# Patient Record
Sex: MALE | Race: WHITE | Employment: OTHER | URBAN - METROPOLITAN AREA
[De-identification: names, ages, dates, MRNs, and addresses within clinical notes are randomized per-mention and may not be internally consistent; named-entity substitution may affect disease eponyms.]

---

## 2017-02-28 ENCOUNTER — HOSPITAL ENCOUNTER (OUTPATIENT)
Dept: PHYSICAL THERAPY | Age: 77
Discharge: HOME | End: 2017-02-28
Payer: MEDICARE

## 2017-02-28 ENCOUNTER — HOSPITAL ENCOUNTER (OUTPATIENT)
Dept: PREADMISSION TESTING | Age: 77
Discharge: HOME | End: 2017-02-28
Payer: MEDICARE

## 2017-02-28 ENCOUNTER — HOSPITAL ENCOUNTER (OUTPATIENT)
Dept: GENERAL RADIOLOGY | Age: 77
Discharge: HOME | End: 2017-02-28
Attending: ORTHOPAEDIC SURGERY
Payer: MEDICARE

## 2017-02-28 VITALS
OXYGEN SATURATION: 97 % | RESPIRATION RATE: 20 BRPM | WEIGHT: 187.5 LBS | HEIGHT: 71 IN | HEART RATE: 65 BPM | DIASTOLIC BLOOD PRESSURE: 63 MMHG | TEMPERATURE: 97.5 F | SYSTOLIC BLOOD PRESSURE: 119 MMHG | BODY MASS INDEX: 26.25 KG/M2

## 2017-02-28 LAB
ABO + RH BLD: NORMAL
ALBUMIN SERPL BCP-MCNC: 3.6 G/DL (ref 3.5–5)
ALBUMIN/GLOB SERPL: 1 {RATIO} (ref 1.1–2.2)
ALP SERPL-CCNC: 80 U/L (ref 45–117)
ALT SERPL-CCNC: 17 U/L (ref 12–78)
ANION GAP BLD CALC-SCNC: 9 MMOL/L (ref 5–15)
APPEARANCE UR: CLEAR
AST SERPL W P-5'-P-CCNC: 14 U/L (ref 15–37)
ATRIAL RATE: 59 BPM
BACTERIA URNS QL MICRO: NEGATIVE /HPF
BILIRUB SERPL-MCNC: 0.4 MG/DL (ref 0.2–1)
BILIRUB UR QL: NEGATIVE
BLOOD GROUP ANTIBODIES SERPL: NORMAL
BUN SERPL-MCNC: 17 MG/DL (ref 6–20)
BUN/CREAT SERPL: 22 (ref 12–20)
CALCIUM SERPL-MCNC: 8.3 MG/DL (ref 8.5–10.1)
CALCULATED P AXIS, ECG09: 30 DEGREES
CALCULATED R AXIS, ECG10: 21 DEGREES
CALCULATED T AXIS, ECG11: 19 DEGREES
CHLORIDE SERPL-SCNC: 104 MMOL/L (ref 97–108)
CO2 SERPL-SCNC: 28 MMOL/L (ref 21–32)
COLOR UR: NORMAL
CREAT SERPL-MCNC: 0.79 MG/DL (ref 0.7–1.3)
DIAGNOSIS, 93000: NORMAL
EPITH CASTS URNS QL MICRO: NORMAL /LPF
ERYTHROCYTE [DISTWIDTH] IN BLOOD BY AUTOMATED COUNT: 13.4 % (ref 11.5–14.5)
EST. AVERAGE GLUCOSE BLD GHB EST-MCNC: 140 MG/DL
GLOBULIN SER CALC-MCNC: 3.5 G/DL (ref 2–4)
GLUCOSE SERPL-MCNC: 128 MG/DL (ref 65–100)
GLUCOSE UR STRIP.AUTO-MCNC: NEGATIVE MG/DL
HBA1C MFR BLD: 6.5 % (ref 4.2–6.3)
HCT VFR BLD AUTO: 37.7 % (ref 36.6–50.3)
HGB BLD-MCNC: 12.3 G/DL (ref 12.1–17)
HGB UR QL STRIP: NEGATIVE
HYALINE CASTS URNS QL MICRO: NORMAL /LPF (ref 0–5)
INR PPP: 1.1 (ref 0.9–1.1)
KETONES UR QL STRIP.AUTO: NEGATIVE MG/DL
LEUKOCYTE ESTERASE UR QL STRIP.AUTO: NEGATIVE
MCH RBC QN AUTO: 30.9 PG (ref 26–34)
MCHC RBC AUTO-ENTMCNC: 32.6 G/DL (ref 30–36.5)
MCV RBC AUTO: 94.7 FL (ref 80–99)
NITRITE UR QL STRIP.AUTO: NEGATIVE
P-R INTERVAL, ECG05: 166 MS
PH UR STRIP: 6 [PH] (ref 5–8)
PLATELET # BLD AUTO: 220 K/UL (ref 150–400)
POTASSIUM SERPL-SCNC: 4.3 MMOL/L (ref 3.5–5.1)
PROT SERPL-MCNC: 7.1 G/DL (ref 6.4–8.2)
PROT UR STRIP-MCNC: NEGATIVE MG/DL
PROTHROMBIN TIME: 10.6 SEC (ref 9–11.1)
Q-T INTERVAL, ECG07: 430 MS
QRS DURATION, ECG06: 134 MS
QTC CALCULATION (BEZET), ECG08: 425 MS
RBC # BLD AUTO: 3.98 M/UL (ref 4.1–5.7)
RBC #/AREA URNS HPF: NORMAL /HPF (ref 0–5)
SODIUM SERPL-SCNC: 141 MMOL/L (ref 136–145)
SP GR UR REFRACTOMETRY: 1.02 (ref 1–1.03)
SPECIMEN EXP DATE BLD: NORMAL
UA: UC IF INDICATED,UAUC: NORMAL
UROBILINOGEN UR QL STRIP.AUTO: 0.2 EU/DL (ref 0.2–1)
VENTRICULAR RATE, ECG03: 59 BPM
WBC # BLD AUTO: 6.7 K/UL (ref 4.1–11.1)
WBC URNS QL MICRO: NORMAL /HPF (ref 0–4)

## 2017-02-28 PROCEDURE — 85610 PROTHROMBIN TIME: CPT | Performed by: ORTHOPAEDIC SURGERY

## 2017-02-28 PROCEDURE — 97161 PT EVAL LOW COMPLEX 20 MIN: CPT

## 2017-02-28 PROCEDURE — 93005 ELECTROCARDIOGRAM TRACING: CPT

## 2017-02-28 PROCEDURE — 36415 COLL VENOUS BLD VENIPUNCTURE: CPT | Performed by: ORTHOPAEDIC SURGERY

## 2017-02-28 PROCEDURE — 80053 COMPREHEN METABOLIC PANEL: CPT | Performed by: ORTHOPAEDIC SURGERY

## 2017-02-28 PROCEDURE — G8978 MOBILITY CURRENT STATUS: HCPCS

## 2017-02-28 PROCEDURE — 97110 THERAPEUTIC EXERCISES: CPT

## 2017-02-28 PROCEDURE — G8980 MOBILITY D/C STATUS: HCPCS

## 2017-02-28 PROCEDURE — 83036 HEMOGLOBIN GLYCOSYLATED A1C: CPT | Performed by: ORTHOPAEDIC SURGERY

## 2017-02-28 PROCEDURE — G8979 MOBILITY GOAL STATUS: HCPCS

## 2017-02-28 PROCEDURE — 81001 URINALYSIS AUTO W/SCOPE: CPT | Performed by: ORTHOPAEDIC SURGERY

## 2017-02-28 PROCEDURE — 86900 BLOOD TYPING SEROLOGIC ABO: CPT | Performed by: ORTHOPAEDIC SURGERY

## 2017-02-28 PROCEDURE — 85027 COMPLETE CBC AUTOMATED: CPT | Performed by: ORTHOPAEDIC SURGERY

## 2017-02-28 RX ORDER — TERAZOSIN 10 MG/1
10 CAPSULE ORAL
COMMUNITY

## 2017-02-28 RX ORDER — VALSARTAN AND HYDROCHLOROTHIAZIDE 320; 12.5 MG/1; MG/1
1 TABLET, FILM COATED ORAL DAILY
COMMUNITY

## 2017-02-28 RX ORDER — SODIUM CHLORIDE, SODIUM LACTATE, POTASSIUM CHLORIDE, CALCIUM CHLORIDE 600; 310; 30; 20 MG/100ML; MG/100ML; MG/100ML; MG/100ML
25 INJECTION, SOLUTION INTRAVENOUS CONTINUOUS
Status: CANCELLED | OUTPATIENT
Start: 2017-03-13

## 2017-02-28 RX ORDER — IBUPROFEN 200 MG
400 TABLET ORAL
COMMUNITY
End: 2017-03-15

## 2017-02-28 RX ORDER — PAROXETINE HYDROCHLORIDE 20 MG/1
20 TABLET, FILM COATED ORAL DAILY
COMMUNITY

## 2017-02-28 NOTE — PERIOP NOTES
Tranexamic Acid:  Used to minimize blood loss, reduce incidence of        symptomatic blood loss anemia and reduce need for blood transfusions                 Two Doses Indicated:         - TXA 1 gram IVPB every 3 hrs for 2 doses. - For TKR the first dose is 30 minutes before tourniquet release and the           second dose  is approximately 3 hours later in PACU. Order placed for 2 doses.  Jeanne Molina RN

## 2017-02-28 NOTE — PERIOP NOTES
Faxed preop labs and ekg to Dr Han Mena for review and request clearance. Faxed preop labs to Dr Steven Gaspar office noting chemistry. Fax confirmed.

## 2017-02-28 NOTE — H&P
[facility], [address]   STAT PREOP HISTORY AND PHYSICAL       Name:  Td Evans   MR#:  [id number]   :  1940   Account #:  [de-identified]        Date of Adm:  2017       CHIEF COMPLAINT: Left knee pain. HISTORY: This is a 55-year-old gentleman with end-stage   osteoarthritis of the left knee with a large loose bodies in the knee, who   is being admitted for left total knee arthroplasty. He has taken anti-  inflammatory medicines. He has a significant amount of instability in   the knee intermittently when walking. ALLERGIES: AMOXICILLIN. MEDICATIONS:   1. Atorvastatin 10 mg/day. 2. Ibuprofen 200 mg tabs. 3. Terazosin 10 mg/day. 4. Lumigan ophthalmic solution for left eye. 5. Metformin 1000 mg p.o. b.i.d.   6. Dorzolamide ophthalmic solution left eye b.i.d.   7. Valsartan/hydrochlorothiazide 320/12.5 one per day. 8. Paroxetine 20 mg/day. ILLNESSES: Positive for hypertension, diabetes mellitus. SURGERIES: She has had bilateral cataracts, right carpal tunnel,   lipoma on the leg, deviated septum, laceration repair left leg. FAMILY HISTORY: Mother  age [de-identified], renal cancer. Father    age 79  cancer of the lung. SOCIAL HISTORY: He is retired. He is right-handed, is former smoker,   been  52 years, has one child. REVIEW OF SYSTEMS   HEENT: Wears reading glasses, has glaucoma and cataracts, lens   implants. PULMONARY: No asthma, COPD, emphysema. CARDIAC: No chest pain, shortness of breath. GI: No peptic ulcer disease or GERD. : Negative. HEPATOBILIARY: No hepatitis. PHYSICAL EXAMINATION   GENERAL: Reveals an alert, well-nourished, well-developed white   male. He is oriented x3. VITAL SIGNS: Blood pressure is 123/60, pulse 60. NOSE, MOUTH, AND OROPHARYNX: He has full extraocular   motions. Tongue is midline. I detect no cervical or supraclavicular   adenopathy. CHEST: Clear to auscultation. CARDIAC: Sinus rhythm without murmurs, gallops, thrills. ABDOMEN: Soft. Bowel sounds are present. EXTREMITIES:  strength is equal in the upper extremity. He has   good forward flexion of the shoulders. The left knee demonstrates a   range of motion from 5-115 degrees. Collateral ligaments are stable. Lachman test is negative. He has good posterior tibialis pulse. He has   some superficial varicosities. The left knee demonstrates a 10-degree   flexion contracture. He has some superficial varicosities in the leg. IMAGING STUDIES: X-rays of the left knee demonstrate severe   tricompartmental osteoarthritis with multiple large loose bodies in the   suprapatellar pouch. IMPRESSION:   1. Severe left knee osteoarthritis. 2. Hypertension. 3. Diabetes mellitus. PLAN: She to be admitted to the hospital and will have a left total knee   arthroplasty performed. I have discussed the proposed surgery risks,   complications, alternatives and expected postoperative course with   him. He understands and agrees to proceed.         MD Kassidy Felipe / Victor Hugo.Chimera   D:  02/28/2017   14:41   T:  02/28/2017   15:02   Job #:  [id number]

## 2017-02-28 NOTE — PROGRESS NOTES
[facility]  Physical Therapy Pre-surgery evaluation  [address]  [place], [address] [facility]    physical Therapy pre TKR surgery EVALUATION  Patient: Mei Hall (89 y.o. male)  Date: 2/28/2017  Primary Diagnosis: left total          ASSESSMENT :  Based on the objective data described below, the patient presents with impaired gait, balance, pain, and overall high level functional mobility due to end stage degenerative joint disease in the L knee. Pt reports independence w/ ambulation, ADLS, and reports history of 1 fall in previous 1 year. Discussed anticipated disposition to home with possible discharge within a 1 to 2 day time frame post-surgery. Patient and  in agreement. Wife present today and states that she will be providing 24hr supervision/assistance at discharge. GOALS: (Goals have been discussed and agreed upon with patient.)  DISCHARGE GOALS: Time Frame: 1 DAY  1. Patient will demonstrate increased strength, range of motion, and pain control via a home exercise program in order to minimize functional deficits in preparation for their upcoming surgery. This will be achieved by using education, demonstration and through the use of an informational handout including a home exercise program.  REHABILITATION POTENTIAL FOR STATED GOALS: Excellent     INTERVENTIONS PLANNED: (Benefits and precautions of physical therapy have been discussed with the patient.)  1. Home Exercise Program  TREATMENT PLAN EFFECTIVE DATES: 2/28/2017 TO 2/28/2017  FREQUENCY/DURATION: Patient to continue to perform home exercise program at least twice daily until his surgery. SUBJECTIVE:   Patient stated [facility] doctor has both of his knees replaced and he's out there riding his bike. I want to do that.     OBJECTIVE DATA SUMMARY:     Past Medical History:   Diagnosis Date    Arthritis     Diabetes ([address]     Hypercholesteremia     Hypertension     Ill-defined condition     clausterphobia Past Surgical History:   Procedure Laterality Date    HX CYST REMOVAL      HX KNEE ARTHROSCOPY Left     HX ORTHOPAEDIC      carpal tunnel surgery     Prior Level of Function/Home Situation: Independent w/ ambulation, ADLs, and reports history of 1 fall. Lives with wife. Retired. Still driving. Reports doing yardwork and housework on a daily basis. Home Situation  Home Environment: Private residence  # Steps to Enter: 7  Rails to Enter: Yes  Hand Rails : Left  One/Two Story Residence: Two story, live on 1st floor  # of Interior Steps: 15  Interior Rails: Both (partially on both)  Lift Chair Available: No  Living Alone: No  Support Systems: Family member(s), Spouse/Significant Other/Partner  Patient Expects to be Discharged to[de-identified] Private residence  Current DME Used/Available at Home: Corita Seal or Shower Type: Shower    ADLs (Current Functional Status): Bathing/Showering:   [x] Independent  [] Requires Assistance from Someone  [] Sponge Bath Only   Ambulation:  [x] Independent  [] Walk Indoors Only  [x] Walk Outdoors  [] Use Assistive Device  [] Use Wheelchair Only     Dressing:  [x] Independent    Requires Assistance from Someone for:  [] Sock/Shoes  [] Pants  [] Everything   Household Activities:  [x] Routine house and yard work  [] Light Housework Only  [] None       Critical Behavior:                Strength:    Strength:  Within functional limits                    Tone & Sensation:   Tone: Normal              Sensation: Intact               Range Of Motion:  AROM: Within functional limits                       Coordination:  Coordination: Within functional limits    Functional Mobility:  Transfers:  Sit to Stand: Independent  Stand to Sit: Independent                       Balance:   Sitting: Intact  Standing: Intact  Ambulation/Gait Training:  Distance (ft): 40 Feet (ft)     Ambulation - Level of Assistance: Independent        Gait Abnormalities: Antalgic        Base of Support: Narrowed Independent ambulation. Mildly antalgic. No LOB or difficulty noted. Therapeutic Exercises:   The patient was educated in, has demonstrated, and has received written instructions to complete for their home exercise program per total knee replacement protocol. Functional Measure:  Lower Extremity Functional Scale (LEFS):      Score 33/80     Percentage of impairment CH  0% CI  1-19% CJ  20-39% CK  40-59% CL  60-79% CM  80-99% CN  100%   LEFS score:  0-80 80 64-79 47-63 31-46 16-30 1-15 0     Cutt-offs: None established  TKA and RICKY:   (Tracee et al, 2000)  MDC = 9 points   MCID = 9 points           G codes: In compliance with CMSs Claims Based Outcome Reporting, the following G-code set was chosen for this patient based on their primary functional limitation being treated: The outcome measure chosen to determine the severity of the functional limitation was the LEFS with a score of 33/80 which was correlated with the impairment scale.     ? Mobility - Walking and Moving Around:     - CURRENT STATUS: CK - 40%-59% impaired, limited or restricted    - GOAL STATUS: CK - 40%-59% impaired, limited or restricted    - D/C STATUS:  CK - 40%-59% impaired, limited or restricted     Pain:  Pain Scale 1: Numeric (0 - 10)  Pain Intensity 1: 5  Pain Location 1: Knee  Pain Orientation 1: Left  Pain Description 1: Aching       Activity Tolerance:   VSS throughout on RA, no SOB      COMMUNICATION/EDUCATION:   The patient was educated on:  [x]         Importance of post operative mobility to achieve their desired outcomes and restore biological function  [x]         The key post operative time frame to address ROM to prevent additional complications    The patients plan of care was discussed with:   [x]         The patient verbalized understanding of his plan in preparation for their upcoming surgery  [x]         The patient's  was present for this session  []         The patient reports that he/she does not have a  identified at this time  [x]         The  verbalized understanding of the education regarding the patient's upcoming surgery  [x]         Patient/family agree to work toward stated goals and plan of care. []         Patient understands intent and goals of therapy, but is neutral about his/her participation. []         Patient is unable to participate in goal setting and plan of care.     Thank you for this referral.  Terra Castro, PT, DPT   Time Calculation: 20 mins

## 2017-02-28 NOTE — PERIOP NOTES
[facility]  Preoperative Instructions        Surgery Date 03/13/2017         Time of Arrival 0545 am Contact #[phone]    1. On the day of your surgery, please report to the Surgical Services Registration Desk and sign in at your designated time. The Surgery Center is located to the right of the Emergency Room. 2. You must have someone with you to drive you home. You should not drive a car for 24 hours following surgery. Please make arrangements for a friend or family member to stay with you for the first 24 hours after your surgery. 3. Do not have anything to eat or drink (including water, gum, mints, coffee, juice) after midnight 03/12/2017. This may not apply to medications prescribed by your physician. Please note special instructions, if applicable. If you are currently taking Plavix, Coumadin, or other blood-thinning agents, contact your surgeon for instructions. 4. We recommend you do not drink any alcoholic beverages for 24 hours before and after your surgery. 5. Have a list of all current medications, including vitamins, herbal supplements and any other over the counter medications. Stop all Aspirin and non-steroidal anti-inflammatory drugs (I.e. Advil, Aleve), as directed by your surgeon's office. Stop all vitamins and herbal supplements seven days prior to your surgery. 6. Wear comfortable clothes. Wear glasses instead of contacts. Do not bring any money or jewelry. Please bring picture ID, insurance card, and any prearranged co-payment or hospital payment. Do not wear make-up, particularly mascara the morning of your surgery. Do not wear nail polish, particularly if you are having foot /hand surgery. Wear your hair loose or down, no ponytails, buns, sarah pins or clips. All body piercings must be removed.   Please shower with antibacterial soap for three consecutive days before and on the morning of surgery, but do not apply any lotions, powders or deodorants after the shower on the day of surgery. Please use a fresh towels after each shower. Please sleep in clean clothes and change bed linens the night before surgery. Please do not shave for 48 hours prior to surgery. Shaving of the face is acceptable. 7. You should understand that if you do not follow these instructions your surgery may be cancelled. If your physical condition changes (I.e. fever, cold or flu) please contact your surgeon as soon as possible. 8. It is important that you be on time. If a situation occurs where you may be late, please call [phone] (OR Holding Area). 9. If you have any questions and or problems, please call [phone] (Pre-admission Testing). 10. Your surgery time may be subject to change. You will receive a phone call the evening prior if your time changes. 11.  If having outpatient surgery, you must have someone to drive you here, stay with you during the duration of your stay, and to drive you home at time of discharge. Special Instructions:    MEDICATIONS TO TAKE THE MORNING OF SURGERY WITH A SIP OF WATER:cosopt eye drops, paxil      I understand a pre-operative phone call will be made to verify my surgery time. In the event that I am not available, I give permission for a message to be left on my answering service and/or with another person?   yes         ___________________      __________   _________    (Signature of Patient)             (Witness)                (Date and Time)

## 2017-03-01 LAB
BACTERIA SPEC CULT: ABNORMAL
BACTERIA SPEC CULT: ABNORMAL
SERVICE CMNT-IMP: ABNORMAL

## 2017-03-02 NOTE — PERIOP NOTES
Mr. Gricelda Schwartz was positive for MSSA on pre op nares screening. Dr. Norberto Finney will have him use Mupirocin ointment to the nares twice daily until surgery. Dr. Norberto Finney has also reviewed Mr. Gricelda Schwartz' pre op CBC and chemistry and is comfortable proceeding with surgery without further testing or treatment.   Roxane Al RN

## 2017-03-10 NOTE — PERIOP NOTES
Preop call made and patient given TOA. Patient instructed- may have water up to 0345 am and then NPO. Spoke to pt's wife and she verbalizes understanding.

## 2017-03-13 ENCOUNTER — HOSPITAL ENCOUNTER (INPATIENT)
Age: 77
LOS: 2 days | Discharge: HOME | DRG: 470 | End: 2017-03-15
Attending: ORTHOPAEDIC SURGERY | Admitting: ORTHOPAEDIC SURGERY
Payer: MEDICARE

## 2017-03-13 ENCOUNTER — ANESTHESIA (OUTPATIENT)
Dept: SURGERY | Age: 77
DRG: 470 | End: 2017-03-13
Payer: MEDICARE

## 2017-03-13 ENCOUNTER — ANESTHESIA EVENT (OUTPATIENT)
Dept: SURGERY | Age: 77
DRG: 470 | End: 2017-03-13
Payer: MEDICARE

## 2017-03-13 PROBLEM — M17.12 PRIMARY LOCALIZED OSTEOARTHRITIS OF LEFT KNEE: Status: ACTIVE | Noted: 2017-03-13

## 2017-03-13 PROBLEM — M17.12 LOCALIZED PRIMARY OSTEOARTHRITIS OF LEFT LOWER LEG: Status: ACTIVE | Noted: 2017-03-13

## 2017-03-13 LAB
GLUCOSE BLD STRIP.AUTO-MCNC: 112 MG/DL (ref 65–100)
GLUCOSE BLD STRIP.AUTO-MCNC: 243 MG/DL (ref 65–100)
GLUCOSE BLD STRIP.AUTO-MCNC: 93 MG/DL (ref 65–100)
GLUCOSE BLD STRIP.AUTO-MCNC: 98 MG/DL (ref 65–100)
SERVICE CMNT-IMP: ABNORMAL
SERVICE CMNT-IMP: ABNORMAL
SERVICE CMNT-IMP: NORMAL
SERVICE CMNT-IMP: NORMAL

## 2017-03-13 PROCEDURE — 74011250636 HC RX REV CODE- 250/636

## 2017-03-13 PROCEDURE — 77030028907 HC WRP KNEE WO BGS SOLM -B

## 2017-03-13 PROCEDURE — 74011000272 HC RX REV CODE- 272: Performed by: ORTHOPAEDIC SURGERY

## 2017-03-13 PROCEDURE — C1776 JOINT DEVICE (IMPLANTABLE): HCPCS | Performed by: ORTHOPAEDIC SURGERY

## 2017-03-13 PROCEDURE — 76210000017 HC OR PH I REC 1.5 TO 2 HR: Performed by: ORTHOPAEDIC SURGERY

## 2017-03-13 PROCEDURE — 77030032490 HC SLV COMPR SCD KNE COVD -B: Performed by: ORTHOPAEDIC SURGERY

## 2017-03-13 PROCEDURE — 76010000171 HC OR TIME 2 TO 2.5 HR INTENSV-TIER 1: Performed by: ORTHOPAEDIC SURGERY

## 2017-03-13 PROCEDURE — 65270000029 HC RM PRIVATE

## 2017-03-13 PROCEDURE — 77030018836 HC SOL IRR NACL ICUM -A: Performed by: ORTHOPAEDIC SURGERY

## 2017-03-13 PROCEDURE — 0SCD0ZZ EXTIRPATION OF MATTER FROM LEFT KNEE JOINT, OPEN APPROACH: ICD-10-PCS | Performed by: ORTHOPAEDIC SURGERY

## 2017-03-13 PROCEDURE — 77030008467 HC STPLR SKN COVD -B: Performed by: ORTHOPAEDIC SURGERY

## 2017-03-13 PROCEDURE — 77030033138 HC SUT PGA STRATFX J&J -B: Performed by: ORTHOPAEDIC SURGERY

## 2017-03-13 PROCEDURE — 77030020782 HC GWN BAIR PAWS FLX 3M -B

## 2017-03-13 PROCEDURE — 77030020788: Performed by: ORTHOPAEDIC SURGERY

## 2017-03-13 PROCEDURE — 74011000250 HC RX REV CODE- 250: Performed by: ORTHOPAEDIC SURGERY

## 2017-03-13 PROCEDURE — 74011250636 HC RX REV CODE- 250/636: Performed by: ANESTHESIOLOGY

## 2017-03-13 PROCEDURE — 77030016547 HC BLD SAW SAG1 STRY -B: Performed by: ORTHOPAEDIC SURGERY

## 2017-03-13 PROCEDURE — 77030035236 HC SUT PDS STRATFX BARB J&J -B: Performed by: ORTHOPAEDIC SURGERY

## 2017-03-13 PROCEDURE — 77030018846 HC SOL IRR STRL H20 ICUM -A: Performed by: ORTHOPAEDIC SURGERY

## 2017-03-13 PROCEDURE — 0SRD0J9 REPLACEMENT OF LEFT KNEE JOINT WITH SYNTHETIC SUBSTITUTE, CEMENTED, OPEN APPROACH: ICD-10-PCS | Performed by: ORTHOPAEDIC SURGERY

## 2017-03-13 PROCEDURE — 74011000250 HC RX REV CODE- 250

## 2017-03-13 PROCEDURE — 77030034849: Performed by: ORTHOPAEDIC SURGERY

## 2017-03-13 PROCEDURE — 74011000258 HC RX REV CODE- 258: Performed by: ORTHOPAEDIC SURGERY

## 2017-03-13 PROCEDURE — 97530 THERAPEUTIC ACTIVITIES: CPT

## 2017-03-13 PROCEDURE — 77030012406 HC DRN WND PENRS BARD -A: Performed by: ORTHOPAEDIC SURGERY

## 2017-03-13 PROCEDURE — G8979 MOBILITY GOAL STATUS: HCPCS

## 2017-03-13 PROCEDURE — 74011250636 HC RX REV CODE- 250/636: Performed by: ORTHOPAEDIC SURGERY

## 2017-03-13 PROCEDURE — 77030031139 HC SUT VCRL2 J&J -A: Performed by: ORTHOPAEDIC SURGERY

## 2017-03-13 PROCEDURE — 77030018779 HC MIX CEM PRSM J&J -B: Performed by: ORTHOPAEDIC SURGERY

## 2017-03-13 PROCEDURE — 77030011640 HC PAD GRND REM COVD -A: Performed by: ORTHOPAEDIC SURGERY

## 2017-03-13 PROCEDURE — 74011000258 HC RX REV CODE- 258

## 2017-03-13 PROCEDURE — G8978 MOBILITY CURRENT STATUS: HCPCS

## 2017-03-13 PROCEDURE — 74011250636 HC RX REV CODE- 250/636: Performed by: NURSE PRACTITIONER

## 2017-03-13 PROCEDURE — C1713 ANCHOR/SCREW BN/BN,TIS/BN: HCPCS | Performed by: ORTHOPAEDIC SURGERY

## 2017-03-13 PROCEDURE — 74011250637 HC RX REV CODE- 250/637: Performed by: ORTHOPAEDIC SURGERY

## 2017-03-13 PROCEDURE — 82962 GLUCOSE BLOOD TEST: CPT

## 2017-03-13 PROCEDURE — 76060000035 HC ANESTHESIA 2 TO 2.5 HR: Performed by: ORTHOPAEDIC SURGERY

## 2017-03-13 PROCEDURE — 97161 PT EVAL LOW COMPLEX 20 MIN: CPT

## 2017-03-13 PROCEDURE — 77030014125 HC TY EPDRL BBMI -B: Performed by: ANESTHESIOLOGY

## 2017-03-13 DEVICE — INSERT TIB RP FEM KNEE CEM: Type: IMPLANTABLE DEVICE | Site: KNEE | Status: FUNCTIONAL

## 2017-03-13 DEVICE — COMPONENT FEM SZ 7 L KNEE POST STBL CEM ATTUNE: Type: IMPLANTABLE DEVICE | Site: KNEE | Status: FUNCTIONAL

## 2017-03-13 DEVICE — BASEPLATE TIB SZ 8 KNEE CEM FIX BEAR ATTUNE: Type: IMPLANTABLE DEVICE | Site: KNEE | Status: FUNCTIONAL

## 2017-03-13 DEVICE — COMPONENT PAT DIA41MM POLYETH DOME CEM MEDIALIZED ATTUNE: Type: IMPLANTABLE DEVICE | Site: KNEE | Status: FUNCTIONAL

## 2017-03-13 DEVICE — CEMENT BNE 40GM FULL DOSE PMMA W/O GENT M VISC N RADPQ LNG: Type: IMPLANTABLE DEVICE | Site: KNEE | Status: FUNCTIONAL

## 2017-03-13 DEVICE — INSERT TIB SZ 7 THK10MM POST STBL FIX BEAR ATTUNE: Type: IMPLANTABLE DEVICE | Site: KNEE | Status: FUNCTIONAL

## 2017-03-13 RX ORDER — OXYCODONE HYDROCHLORIDE 5 MG/1
10 TABLET ORAL
Status: DISCONTINUED | OUTPATIENT
Start: 2017-03-13 | End: 2017-03-15 | Stop reason: HOSPADM

## 2017-03-13 RX ORDER — LEVOFLOXACIN 5 MG/ML
INJECTION, SOLUTION INTRAVENOUS AS NEEDED
Status: DISCONTINUED | OUTPATIENT
Start: 2017-03-13 | End: 2017-03-13 | Stop reason: HOSPADM

## 2017-03-13 RX ORDER — LATANOPROST 50 UG/ML
1 SOLUTION/ DROPS OPHTHALMIC
Status: DISCONTINUED | OUTPATIENT
Start: 2017-03-13 | End: 2017-03-15 | Stop reason: HOSPADM

## 2017-03-13 RX ORDER — ASPIRIN 325 MG
325 TABLET, DELAYED RELEASE (ENTERIC COATED) ORAL 2 TIMES DAILY
Status: DISCONTINUED | OUTPATIENT
Start: 2017-03-13 | End: 2017-03-15 | Stop reason: HOSPADM

## 2017-03-13 RX ORDER — OXYCODONE HYDROCHLORIDE 5 MG/1
5 TABLET ORAL
Status: DISCONTINUED | OUTPATIENT
Start: 2017-03-13 | End: 2017-03-15 | Stop reason: HOSPADM

## 2017-03-13 RX ORDER — PHENYLEPHRINE HCL IN 0.9% NACL 0.4MG/10ML
SYRINGE (ML) INTRAVENOUS AS NEEDED
Status: DISCONTINUED | OUTPATIENT
Start: 2017-03-13 | End: 2017-03-13 | Stop reason: HOSPADM

## 2017-03-13 RX ORDER — DEXTROSE 50 % IN WATER (D50W) INTRAVENOUS SYRINGE
12.5-25 AS NEEDED
Status: DISCONTINUED | OUTPATIENT
Start: 2017-03-13 | End: 2017-03-15 | Stop reason: HOSPADM

## 2017-03-13 RX ORDER — SODIUM CHLORIDE, SODIUM LACTATE, POTASSIUM CHLORIDE, CALCIUM CHLORIDE 600; 310; 30; 20 MG/100ML; MG/100ML; MG/100ML; MG/100ML
25 INJECTION, SOLUTION INTRAVENOUS CONTINUOUS
Status: DISCONTINUED | OUTPATIENT
Start: 2017-03-13 | End: 2017-03-13 | Stop reason: HOSPADM

## 2017-03-13 RX ORDER — ONDANSETRON 2 MG/ML
INJECTION INTRAMUSCULAR; INTRAVENOUS AS NEEDED
Status: DISCONTINUED | OUTPATIENT
Start: 2017-03-13 | End: 2017-03-13 | Stop reason: HOSPADM

## 2017-03-13 RX ORDER — MORPHINE SULFATE 2 MG/ML
2 INJECTION, SOLUTION INTRAMUSCULAR; INTRAVENOUS
Status: ACTIVE | OUTPATIENT
Start: 2017-03-13 | End: 2017-03-14

## 2017-03-13 RX ORDER — SODIUM CHLORIDE, SODIUM LACTATE, POTASSIUM CHLORIDE, CALCIUM CHLORIDE 600; 310; 30; 20 MG/100ML; MG/100ML; MG/100ML; MG/100ML
INJECTION, SOLUTION INTRAVENOUS
Status: DISCONTINUED | OUTPATIENT
Start: 2017-03-13 | End: 2017-03-13 | Stop reason: HOSPADM

## 2017-03-13 RX ORDER — LEVOFLOXACIN 5 MG/ML
500 INJECTION, SOLUTION INTRAVENOUS ONCE
Status: DISCONTINUED | OUTPATIENT
Start: 2017-03-13 | End: 2017-03-13 | Stop reason: HOSPADM

## 2017-03-13 RX ORDER — DEXAMETHASONE SODIUM PHOSPHATE 4 MG/ML
10 INJECTION, SOLUTION INTRA-ARTICULAR; INTRALESIONAL; INTRAMUSCULAR; INTRAVENOUS; SOFT TISSUE DAILY
Status: COMPLETED | OUTPATIENT
Start: 2017-03-14 | End: 2017-03-14

## 2017-03-13 RX ORDER — VANCOMYCIN/0.9 % SOD CHLORIDE 1.5G/250ML
1500 PLASTIC BAG, INJECTION (ML) INTRAVENOUS ONCE
Status: COMPLETED | OUTPATIENT
Start: 2017-03-13 | End: 2017-03-13

## 2017-03-13 RX ORDER — MAGNESIUM SULFATE 100 %
4 CRYSTALS MISCELLANEOUS AS NEEDED
Status: DISCONTINUED | OUTPATIENT
Start: 2017-03-13 | End: 2017-03-15 | Stop reason: HOSPADM

## 2017-03-13 RX ORDER — FACIAL-BODY WIPES
10 EACH TOPICAL DAILY PRN
Status: DISCONTINUED | OUTPATIENT
Start: 2017-03-15 | End: 2017-03-15 | Stop reason: HOSPADM

## 2017-03-13 RX ORDER — PROPOFOL 10 MG/ML
INJECTION, EMULSION INTRAVENOUS
Status: DISCONTINUED | OUTPATIENT
Start: 2017-03-13 | End: 2017-03-13 | Stop reason: HOSPADM

## 2017-03-13 RX ORDER — ACETAMINOPHEN 500 MG
1000 TABLET ORAL ONCE
Status: COMPLETED | OUTPATIENT
Start: 2017-03-13 | End: 2017-03-13

## 2017-03-13 RX ORDER — VANCOMYCIN/0.9 % SOD CHLORIDE 1.5G/250ML
1500 PLASTIC BAG, INJECTION (ML) INTRAVENOUS EVERY 12 HOURS
Status: COMPLETED | OUTPATIENT
Start: 2017-03-13 | End: 2017-03-14

## 2017-03-13 RX ORDER — FAMOTIDINE 20 MG/1
20 TABLET, FILM COATED ORAL 2 TIMES DAILY
Status: DISCONTINUED | OUTPATIENT
Start: 2017-03-13 | End: 2017-03-15 | Stop reason: HOSPADM

## 2017-03-13 RX ORDER — TERAZOSIN 5 MG/1
10 CAPSULE ORAL
Status: DISCONTINUED | OUTPATIENT
Start: 2017-03-13 | End: 2017-03-15 | Stop reason: HOSPADM

## 2017-03-13 RX ORDER — INSULIN LISPRO 100 [IU]/ML
INJECTION, SOLUTION INTRAVENOUS; SUBCUTANEOUS
Status: DISCONTINUED | OUTPATIENT
Start: 2017-03-13 | End: 2017-03-15 | Stop reason: HOSPADM

## 2017-03-13 RX ORDER — DIPHENHYDRAMINE HCL 12.5MG/5ML
12.5 ELIXIR ORAL
Status: ACTIVE | OUTPATIENT
Start: 2017-03-13 | End: 2017-03-14

## 2017-03-13 RX ORDER — SODIUM CHLORIDE 0.9 % (FLUSH) 0.9 %
5-10 SYRINGE (ML) INJECTION EVERY 8 HOURS
Status: DISCONTINUED | OUTPATIENT
Start: 2017-03-14 | End: 2017-03-15 | Stop reason: HOSPADM

## 2017-03-13 RX ORDER — DORZOLAMIDE HYDROCHLORIDE AND TIMOLOL MALEATE 20; 5 MG/ML; MG/ML
1 SOLUTION/ DROPS OPHTHALMIC 2 TIMES DAILY
Status: DISCONTINUED | OUTPATIENT
Start: 2017-03-13 | End: 2017-03-15 | Stop reason: HOSPADM

## 2017-03-13 RX ORDER — ACETAMINOPHEN 325 MG/1
650 TABLET ORAL EVERY 6 HOURS
Status: DISCONTINUED | OUTPATIENT
Start: 2017-03-13 | End: 2017-03-15 | Stop reason: HOSPADM

## 2017-03-13 RX ORDER — VANCOMYCIN HYDROCHLORIDE 1 G/20ML
INJECTION, POWDER, LYOPHILIZED, FOR SOLUTION INTRAVENOUS AS NEEDED
Status: DISCONTINUED | OUTPATIENT
Start: 2017-03-13 | End: 2017-03-13 | Stop reason: HOSPADM

## 2017-03-13 RX ORDER — PAROXETINE HYDROCHLORIDE 20 MG/1
20 TABLET, FILM COATED ORAL DAILY
Status: DISCONTINUED | OUTPATIENT
Start: 2017-03-14 | End: 2017-03-15 | Stop reason: HOSPADM

## 2017-03-13 RX ORDER — POLYETHYLENE GLYCOL 3350 17 G/17G
17 POWDER, FOR SOLUTION ORAL DAILY
Status: DISCONTINUED | OUTPATIENT
Start: 2017-03-14 | End: 2017-03-15 | Stop reason: HOSPADM

## 2017-03-13 RX ORDER — CELECOXIB 200 MG/1
200 CAPSULE ORAL ONCE
Status: COMPLETED | OUTPATIENT
Start: 2017-03-13 | End: 2017-03-13

## 2017-03-13 RX ORDER — ONDANSETRON 2 MG/ML
4 INJECTION INTRAMUSCULAR; INTRAVENOUS
Status: ACTIVE | OUTPATIENT
Start: 2017-03-13 | End: 2017-03-14

## 2017-03-13 RX ORDER — METFORMIN HYDROCHLORIDE 500 MG/1
1000 TABLET ORAL 2 TIMES DAILY WITH MEALS
Status: DISCONTINUED | OUTPATIENT
Start: 2017-03-14 | End: 2017-03-15 | Stop reason: HOSPADM

## 2017-03-13 RX ORDER — SODIUM CHLORIDE 9 MG/ML
125 INJECTION, SOLUTION INTRAVENOUS CONTINUOUS
Status: DISPENSED | OUTPATIENT
Start: 2017-03-13 | End: 2017-03-14

## 2017-03-13 RX ORDER — KETOROLAC TROMETHAMINE 30 MG/ML
15 INJECTION, SOLUTION INTRAMUSCULAR; INTRAVENOUS EVERY 6 HOURS
Status: COMPLETED | OUTPATIENT
Start: 2017-03-13 | End: 2017-03-14

## 2017-03-13 RX ORDER — ATORVASTATIN CALCIUM 10 MG/1
10 TABLET, FILM COATED ORAL DAILY
Status: DISCONTINUED | OUTPATIENT
Start: 2017-03-14 | End: 2017-03-15 | Stop reason: HOSPADM

## 2017-03-13 RX ORDER — MIDAZOLAM HYDROCHLORIDE 1 MG/ML
INJECTION, SOLUTION INTRAMUSCULAR; INTRAVENOUS AS NEEDED
Status: DISCONTINUED | OUTPATIENT
Start: 2017-03-13 | End: 2017-03-13 | Stop reason: HOSPADM

## 2017-03-13 RX ORDER — BUPIVACAINE HYDROCHLORIDE 7.5 MG/ML
INJECTION, SOLUTION EPIDURAL; RETROBULBAR AS NEEDED
Status: DISCONTINUED | OUTPATIENT
Start: 2017-03-13 | End: 2017-03-13 | Stop reason: HOSPADM

## 2017-03-13 RX ORDER — SODIUM CHLORIDE 0.9 % (FLUSH) 0.9 %
5-10 SYRINGE (ML) INJECTION AS NEEDED
Status: DISCONTINUED | OUTPATIENT
Start: 2017-03-13 | End: 2017-03-15 | Stop reason: HOSPADM

## 2017-03-13 RX ORDER — NALOXONE HYDROCHLORIDE 0.4 MG/ML
0.4 INJECTION, SOLUTION INTRAMUSCULAR; INTRAVENOUS; SUBCUTANEOUS AS NEEDED
Status: DISCONTINUED | OUTPATIENT
Start: 2017-03-13 | End: 2017-03-15 | Stop reason: HOSPADM

## 2017-03-13 RX ORDER — AMOXICILLIN 250 MG
1 CAPSULE ORAL 2 TIMES DAILY
Status: DISCONTINUED | OUTPATIENT
Start: 2017-03-13 | End: 2017-03-15 | Stop reason: HOSPADM

## 2017-03-13 RX ADMIN — OXYCODONE HYDROCHLORIDE 10 MG: 5 TABLET ORAL at 19:13

## 2017-03-13 RX ADMIN — DOCUSATE SODIUM AND SENNOSIDES 1 TABLET: 8.6; 5 TABLET, FILM COATED ORAL at 17:34

## 2017-03-13 RX ADMIN — BUPIVACAINE HYDROCHLORIDE 12 MG: 7.5 INJECTION, SOLUTION EPIDURAL; RETROBULBAR at 07:46

## 2017-03-13 RX ADMIN — MIDAZOLAM HYDROCHLORIDE 2 MG: 1 INJECTION, SOLUTION INTRAMUSCULAR; INTRAVENOUS at 07:29

## 2017-03-13 RX ADMIN — LEVOFLOXACIN 500 MG: 5 INJECTION, SOLUTION INTRAVENOUS at 07:55

## 2017-03-13 RX ADMIN — TRANEXAMIC ACID 1000 MG: 100 INJECTION, SOLUTION INTRAVENOUS at 10:50

## 2017-03-13 RX ADMIN — SODIUM CHLORIDE, SODIUM LACTATE, POTASSIUM CHLORIDE, AND CALCIUM CHLORIDE 25 ML/HR: 600; 310; 30; 20 INJECTION, SOLUTION INTRAVENOUS at 06:45

## 2017-03-13 RX ADMIN — FAMOTIDINE 20 MG: 20 TABLET ORAL at 17:34

## 2017-03-13 RX ADMIN — Medication 80 MCG: at 08:05

## 2017-03-13 RX ADMIN — INSULIN LISPRO 2 UNITS: 100 INJECTION, SOLUTION INTRAVENOUS; SUBCUTANEOUS at 17:26

## 2017-03-13 RX ADMIN — SODIUM CHLORIDE, SODIUM LACTATE, POTASSIUM CHLORIDE, CALCIUM CHLORIDE: 600; 310; 30; 20 INJECTION, SOLUTION INTRAVENOUS at 07:29

## 2017-03-13 RX ADMIN — TERAZOSIN HYDROCHLORIDE 10 MG: 5 CAPSULE ORAL at 23:18

## 2017-03-13 RX ADMIN — ACETAMINOPHEN 1000 MG: 500 TABLET, FILM COATED ORAL at 06:46

## 2017-03-13 RX ADMIN — ONDANSETRON 4 MG: 2 INJECTION INTRAMUSCULAR; INTRAVENOUS at 09:40

## 2017-03-13 RX ADMIN — ACETAMINOPHEN 650 MG: 325 TABLET, FILM COATED ORAL at 15:12

## 2017-03-13 RX ADMIN — SODIUM CHLORIDE 125 ML/HR: 900 INJECTION, SOLUTION INTRAVENOUS at 16:44

## 2017-03-13 RX ADMIN — OXYCODONE HYDROCHLORIDE 10 MG: 5 TABLET ORAL at 23:19

## 2017-03-13 RX ADMIN — SODIUM CHLORIDE 500 ML: 900 INJECTION, SOLUTION INTRAVENOUS at 16:00

## 2017-03-13 RX ADMIN — VANCOMYCIN HYDROCHLORIDE 1500 MG: 10 INJECTION, POWDER, LYOPHILIZED, FOR SOLUTION INTRAVENOUS at 06:46

## 2017-03-13 RX ADMIN — DORZOLAMIDE HYDROCHLORIDE AND TIMOLOL MALEATE 1 DROP: 20; 5 SOLUTION/ DROPS OPHTHALMIC at 18:00

## 2017-03-13 RX ADMIN — PROPOFOL 100 MCG/KG/MIN: 10 INJECTION, EMULSION INTRAVENOUS at 07:40

## 2017-03-13 RX ADMIN — VANCOMYCIN HYDROCHLORIDE 1500 MG: 10 INJECTION, POWDER, LYOPHILIZED, FOR SOLUTION INTRAVENOUS at 23:19

## 2017-03-13 RX ADMIN — CELECOXIB 200 MG: 200 CAPSULE ORAL at 06:46

## 2017-03-13 RX ADMIN — Medication 40 MCG: at 08:00

## 2017-03-13 RX ADMIN — ASPIRIN 325 MG: 325 TABLET, COATED ORAL at 17:34

## 2017-03-13 RX ADMIN — MIDAZOLAM HYDROCHLORIDE 0.5 MG: 1 INJECTION, SOLUTION INTRAMUSCULAR; INTRAVENOUS at 07:35

## 2017-03-13 RX ADMIN — ACETAMINOPHEN 650 MG: 325 TABLET, FILM COATED ORAL at 23:18

## 2017-03-13 RX ADMIN — KETOROLAC TROMETHAMINE 15 MG: 30 INJECTION, SOLUTION INTRAMUSCULAR at 23:19

## 2017-03-13 RX ADMIN — SODIUM CHLORIDE 125 ML/HR: 900 INJECTION, SOLUTION INTRAVENOUS at 10:15

## 2017-03-13 RX ADMIN — SODIUM CHLORIDE, SODIUM LACTATE, POTASSIUM CHLORIDE, CALCIUM CHLORIDE: 600; 310; 30; 20 INJECTION, SOLUTION INTRAVENOUS at 09:08

## 2017-03-13 RX ADMIN — OXYCODONE HYDROCHLORIDE 10 MG: 5 TABLET ORAL at 15:12

## 2017-03-13 RX ADMIN — KETOROLAC TROMETHAMINE 15 MG: 30 INJECTION, SOLUTION INTRAMUSCULAR at 17:31

## 2017-03-13 NOTE — ANESTHESIA PREPROCEDURE EVALUATION
Anesthetic History   No history of anesthetic complications            Review of Systems / Medical History  Patient summary reviewed, nursing notes reviewed and pertinent labs reviewed    Pulmonary                Comments: Former Smoker - 1 pack years   Neuro/Psych   Within defined limits           Cardiovascular    Hypertension          Hyperlipidemia    Exercise tolerance: >4 METS  Comments: RBBB     GI/Hepatic/Renal                Endo/Other    Diabetes: type 2    Arthritis     Other Findings   Comments: claustraphobia         Physical Exam    Airway  Mallampati: III    Neck ROM: normal range of motion   Mouth opening: Normal     Cardiovascular  Regular rate and rhythm,  S1 and S2 normal,  no murmur, click, rub, or gallop             Dental    Dentition: Implants     Pulmonary  Breath sounds clear to auscultation               Abdominal  GI exam deferred       Other Findings            Anesthetic Plan    ASA: 2  Anesthesia type: spinal          Induction: Intravenous  Anesthetic plan and risks discussed with: Patient

## 2017-03-13 NOTE — PERIOP NOTES
Patient: Olga Jim MRN: [id number]  SSN: [id number]   YOB: 1940  Age: 68 y.o. Sex: male     Patient is status post Procedure(s):  LEFT TOTAL KNEE REPLACMENT. Surgeon(s) and Role:     * Shashi Diaz MD - Primary    Local/Dose/Irrigation: 64 ML of Dr. Alfonzo Sánchez Injection to left knee. Peripheral IV 03/13/17 Right Arm (Active)   Site Assessment Clean, dry, & intact 3/13/2017  6:45 AM   Phlebitis Assessment 0 3/13/2017  6:45 AM   Infiltration Assessment 0 3/13/2017  6:45 AM   Dressing Status Clean, dry, & intact 3/13/2017  6:45 AM   Dressing Type Tape;Transparent 3/13/2017  6:45 AM   Hub Color/Line Status Pink; Infusing 3/13/2017  6:45 AM          Hemovac Left Knee (Active)   Site Assessment Clean, dry, & intact 3/13/2017  9:09 AM   Dressing Status Clean, dry, & intact 3/13/2017  9:09 AM   Drainage Description Serosanguinous 3/13/2017  9:09 AM   Status Patent; Charged 3/13/2017  9:09 AM                     Dressing/Packing:  Wound Knee Left-DRESSING TYPE: 4 x 4;ABD pad;Petroleum gauze; Cast padding;Elastic bandage;Staples (03/13/17 9968)  Splint/Cast:  ]          Osorio Catheter removed at end of case.

## 2017-03-13 NOTE — IP AVS SNAPSHOT
[address] [facility] 
[phone] Patient: Mei Hall MRN: [id number] You are allergic to the following No active allergies Recent Documentation Height Weight BMI Smoking Status 1.791 m 89 kg 27.76 kg/m2 Former Smoker Emergency Contacts Name Discharge Info Relation Home Work Mobile Crista Streeter DISCHARGE CAREGIVER [3] Spouse [3] [phone] About your hospitalization You were admitted on:  March 13, 2017 You last received care in the:  [facility] 3 ORTHOPEDICS You were discharged on:  March 15, 2017 Why you were hospitalized Your primary diagnosis was:  Not on File Your diagnoses also included:  Localized Primary Osteoarthritis Of Left Lower Leg, Primary Localized Osteoarthritis Of Left Knee Providers Seen During Your Hospitalizations Provider Role Specialty Primary office phone Ju Donnelly MD Attending Provider Orthopedic Surgery [phone] Your Primary Care Physician (PCP) Primary Care Physician Office Phone Office Fax Esperanza Pearce [phone] Follow-up Information Follow up With Details Comments Contact Info Ju Donnelly MD On 3/27/2017 @ 3 pm [address] [facility] [address] [facility] 
[phone] Current Discharge Medication List  
  
START taking these medications Dose & Instructions Dispensing Information Comments Morning Noon Evening Bedtime  
 acetaminophen 325 mg tablet Commonly known as:  TYLENOL Your last dose was: Your next dose is:    
   
   
 Dose:  650 mg Take 2 Tabs by mouth every six (6) hours for 14 days. Quantity:  112 Tab Refills:  0  
     
   
   
   
  
 aspirin delayed-release 325 mg tablet Your last dose was:     
   
Your next dose is:    
   
   
 Dose:  325 mg  
 Take 1 Tab by mouth two (2) times a day for 30 days. Quantity:  60 Tab Refills:  0  
     
   
   
   
  
 famotidine 20 mg tablet Commonly known as:  PEPCID Your last dose was: Your next dose is:    
   
   
 Dose:  20 mg Take 1 Tab by mouth two (2) times a day for 30 days. Quantity:  60 Tab Refills:  0  
     
   
   
   
  
 oxyCODONE IR 5 mg immediate release tablet Commonly known as:  Sade Beasley Your last dose was: Your next dose is:    
   
   
 Dose:  5-10 mg Take 1-2 Tabs by mouth every three (3) hours as needed. Max Daily Amount: 80 mg.  
 Quantity:  80 Tab Refills:  0  
     
   
   
   
  
 polyethylene glycol 17 gram packet Commonly known as:  Beola Lunenburg Your last dose was: Your next dose is:    
   
   
 Dose:  17 g Take 1 Packet by mouth daily as needed (constipation) for up to 15 days. Quantity:  15 Packet Refills:  0  
     
   
   
   
  
 senna-docusate 8.6-50 mg per tablet Commonly known as:  Marcia Kumar Your last dose was: Your next dose is:    
   
   
 Dose:  1 Tab Take 1 Tab by mouth daily. Quantity:  30 Tab Refills:  0 CONTINUE these medications which have NOT CHANGED Dose & Instructions Dispensing Information Comments Morning Noon Evening Bedtime BACTROBAN NASAL 2 % nasal ointment Generic drug:  mupirocin calcium Your last dose was: Your next dose is:    
   
   
 by Both Nostrils route two (2) times a day. Pt to start 03/02/2017 BID X 5 days for Apparent Staph. Refills:  0  
     
   
   
   
  
 COSOPT 22.3-6.8 mg/mL ophthalmic solution Generic drug:  dorzolamide-timolol Your last dose was: Your next dose is:    
   
   
 Dose:  1 Drop Administer 1 Drop to left eye two (2) times a day. Refills:  0 EPINEPHrine 0.3 mg/0.3 mL injection Commonly known as:  Dedra Cheney Your last dose was: Your next dose is:    
   
   
 Dose:  0.3 mg  
0.3 mL by IntraMUSCular route once as needed for up to 1 dose. Quantity:  1 Syringe Refills:  0 LIPITOR 10 mg tablet Generic drug:  atorvastatin Your last dose was: Your next dose is:    
   
   
 Dose:  10 mg Take 10 mg by mouth daily. Refills:  0 LUMIGAN 0.03 % ophthalmic drops Generic drug:  bimatoprost  
   
Your last dose was: Your next dose is:    
   
   
 Dose:  1 Drop Administer 1 Drop to left eye nightly. Refills:  0  
     
   
   
   
  
 metFORMIN 1,000 mg tablet Commonly known as:  GLUCOPHAGE Your last dose was: Your next dose is:    
   
   
 Dose:  1000 mg Take 1,000 mg by mouth two (2) times daily (with meals). Refills:  0 PAXIL 20 mg tablet Generic drug:  PARoxetine Your last dose was: Your next dose is:    
   
   
 Dose:  20 mg Take 20 mg by mouth daily. Refills:  0  
     
   
   
   
  
 terazosin 10 mg capsule Commonly known as:  HYTRIN Your last dose was: Your next dose is:    
   
   
 Dose:  10 mg Take 10 mg by mouth nightly. Refills:  0  
     
   
   
   
  
 valsartan-hydroCHLOROthiazide 320-12.5 mg per tablet Commonly known as:  DIOVAN-HCT Your last dose was: Your next dose is:    
   
   
 Dose:  1 Tab Take 1 Tab by mouth daily. Indications: hypertension Refills:  0 STOP taking these medications   
 ibuprofen 200 mg tablet Commonly known as:  MOTRIN Where to Get Your Medications Information on where to get these meds will be given to you by the nurse or doctor. ! Ask your nurse or doctor about these medications  
  acetaminophen 325 mg tablet  
 aspirin delayed-release 325 mg tablet  
 famotidine 20 mg tablet  
 oxyCODONE IR 5 mg immediate release tablet  
 polyethylene glycol 17 gram packet senna-docusate 8.6-50 mg per tablet Discharge Instructions Evert Robles Surgery: Total Knee Replacement Surgeon:   Lucien Laboy MD 
Surgery Date:  3/13/2017 ? To relieve pain: ? Use ice/gel packs. ? Put the ice pack directly over the wound, or anywhere you are hurting or swollen. ? To control pain and swelling, keep ice on regularly, especially after physical activity. ? The packs should stay cold for 3-4 hours. When it is not cold anymore, rotate with the packs in the freezer. ? Elevate your leg. This will also keep swelling down. ? Rest for at least 20 minutes between activity or exercises. ? To keep track of your pain medications, write down what you take and when you take it. ? The last dose of pain medication you got in the hospital was:    
 
Medication Dose Date & Time ? Choose your medications based on the pain scale below: ? To keep your pain under control, take Tylenol every 6 hours for 14 days - even if you feel like you dont need it. ? For mild to moderate pain (1-6 on pain scale), take one pain pill every 3-4 hours as needed. ? For severe pain (7-10 on pain scale), take two pain pills every 3-4 hours as needed. ? To prevent nausea, take your pain medications with food. Pain Scale ? As your pain lessens: 
 
? Slowly start taking less pain medication. You may do this by waiting longer between doses or by taking smaller doses. ? Stop using the pain medications as soon as you no longer need it, usually in 2-3 weeks. ? Aspirin ? To prevent blood clots, you will need to take Aspirin 325 mg twice a day for 30 days. ? To prevent stomach upset or bleeding: ? Do not take non-steroidal anti-inflammatory medications (Ibuprofen, Advil, Motrin, Naproxen, etc.) ? Take Pepcid 20 mg twice a day, or a similar home medication, while you are taking a blood thinner. OPSITE (Honeycomb dressing) ? Keep your clear, waterproof dressing in place for 5-7 days after your leave the hospital. 
 
? If you are still having drainage, you will need to change your dressing in 5-7 days. You will be given one extra dressing to use at home. ? If there is no more drainage from the wound, you may leave it open to the air. OPSITE DRESSING INSTRUCTIONS ? To increase and promote healing: 
? Stop Smoking (or at least cut back on 
     Smoking). ? Eat a well-balanced diet (high in protein 
     and vitamin C). ? If you have a poor appetite, drink Ensure, Glucerna, or  
      [place] Instant Breakfast for the next 30 days. ? If you are diabetic, you should control your blood  
      sugars to prevent infection and help your wound  
      to heal. 
               
 
 
 
? To prevent constipation, stay active and drink plenty of fluid. ? While using pain medications, you should also take stool softeners and laxatives, such as Pericolace 
     and Miralax. ? If you are having too many bowel Movements, then you may need 
     to stop taking the laxatives. ? You should have a bowel movement 3-4 days  
     after surgery and then at least every other day while 
     on pain medication. ? To improve your recovery, you must be active! ? Use your walker and take short walks (in your home)  
      about every 2 hours during the day. ? Try to increase how far you walk each day. ? You can put as much weight on your leg as you can tolerate while walking. ? To avoid getting a stiff knee, work on getting your knee bent and straight as soon as possible. ?  Home health physical therapy will come to your 
      home a few times per week to teach you how to 
 get out of bed, to safely walk in your home, and 
      to do your exercises. ? NO DRIVING until your surgeon tells you it is ok. 
 
? You can return to work when cleared by a physician. ? Please call your physician immediately if you have: 
 
? Constant bleeding from your wound. ? Increasing redness or swelling around your wound (Some warmth, bruising and swelling is normal). ? Change in wound drainage (increase in amount, color, or bad smell). ? Change in mental status (unusual behavior ? Temperature over 101.5 degrees Fahrenheit ? Pain or redness in the calf (back of your lower leg  see picture) ? Increased swelling of the thigh, ankle, calf, or foot. ? Emergency: CALL 911 if you have: 
 
? Shortness of breath ? Chest pain when you cough or taking a deep breath ? Please call your surgeons office at [phone]  for a follow up appointment. _ 
? You should call as soon as you get home or the next day after discharge. Ask to make an appointment in 2 weeks. ? If you have questions or concerns during normal business hours, you may reach Dr. Brayden Aparicio team at [phone]. Discharge Orders None General Information Please provide this summary of care documentation to your next provider. Introducing [facility]! [employer] introduces [employer] patient portal. Now you can access parts of your medical record, email your doctor's office, and request medication refills online. 1. In your internet browser, go to [email or web address] 2. Click on the First Time User? Click Here link in the Sign In box. You will see the New Member Sign Up page. 3. Enter your [employer] Access Code exactly as it appears below. You will not need to use this code after youve completed the sign-up process. If you do not sign up before the expiration date, you must request a new code. · [employer] Access Code: [email or web address] Expires: 5/25/2017  4:55 PM 
 
4. Enter the last four digits of your Social Security Number (xxxx) and Date of Birth (mm/dd/yyyy) as indicated and click Submit. You will be taken to the next sign-up page. 5. Create a [employer]Flow ID. This will be your [employer] login ID and cannot be changed, so think of one that is secure and easy to remember. 6. Create a [employer] password. You can change your password at any time. 7. Enter your Password Reset Question and Answer. This can be used at a later time if you forget your password. 8. Enter your e-mail address. You will receive e-mail notification when new information is available in [address] 9. Click Sign Up. You can now view and download portions of your medical record. 10. Click the Download Summary menu link to download a portable copy of your medical information. If you have questions, please visit the Frequently Asked Questions section of the [employer] website. Remember, [employer] is NOT to be used for urgent needs. For medical emergencies, dial 911. Now available from your iPhone and Android! Patient Signature:  ____________________________________________________________ Date:  ____________________________________________________________  
  
Gladys Hardy Provider Signature:  ____________________________________________________________ Date:  ____________________________________________________________

## 2017-03-13 NOTE — ANESTHESIA POSTPROCEDURE EVALUATION
Post-Anesthesia Evaluation and Assessment    Patient: Dann Rodriguez MRN: [id number]  SSN: [id number]    YOB: 1940  Age: 68 y.o. Sex: male       Cardiovascular Function/Vital Signs  Visit Vitals    /62    Pulse (!) 53    Temp 36.5 °C (97.7 °F)    Resp 16    Ht 5' 10.5\" (1.791 m)    Wt 83.5 kg (184 lb 1.4 oz)    SpO2 98%    BMI 26.04 kg/m2       Patient is status post spinal anesthesia for Procedure(s):  LEFT TOTAL KNEE REPLACMENT. Nausea/Vomiting: None    Postoperative hydration reviewed and adequate. Pain:  Pain Scale 1: Numeric (0 - 10) (03/13/17 1010)  Pain Intensity 1: 0 (denies left knee pain) (03/13/17 1010)   Managed    Neurological Status:   Neuro (WDL): Exceptions to WDL (03/13/17 1010)  Neuro  Neurologic State: Drowsy; Eyes open to stimulus (03/13/17 1010)  Orientation Level: Oriented to person;Oriented to place;Oriented to situation (03/13/17 1010)  Cognition: Follows commands (03/13/17 1010)  Speech: Clear (03/13/17 1010)  LUE Motor Response: Purposeful (03/13/17 1010)  LLE Motor Response: Weak (03/13/17 1010)  RUE Motor Response: Purposeful (03/13/17 1010)  RLE Motor Response: Weak (03/13/17 1010)   At baseline    Mental Status and Level of Consciousness: Arousable    Pulmonary Status:   O2 Device: Nasal cannula (03/13/17 1030)   Adequate oxygenation and airway patent    Complications related to anesthesia: None    Post-anesthesia assessment completed.  No concerns    Signed By: Cathy Brown MD     March 13, 2017

## 2017-03-13 NOTE — PERIOP NOTES
Contacted Bed Placement & informed can't get anyone to anyone to answer zone phone (charge nurse Jaja or staff nurse Maximino Essex assigned to room 3826). Bed placement nurse states she will walk over to ortho.

## 2017-03-13 NOTE — H&P
Date of Surgery Update:  Ana Callahan was seen and examined. History and physical has been reviewed. The patient has been examined.  There have been no significant clinical changes since the completion of the originally dated History and Physical.    Signed By: Mayi Valentin MD     March 13, 2017 7:22 AM

## 2017-03-13 NOTE — PERIOP NOTES
TRANSFER - OUT REPORT:    Verbal report given to [facility]) on Dedrick Correia  being transferred to [facility](unit) for routine post - op       Report consisted of patients Situation, Background, Assessment and   Recommendations(SBAR). Information from the following report(s) OR Summary, Intake/Output and MAR was reviewed with the receiving nurse. Opportunity for questions and clarification was provided.       Patient transported with:   O2 @ 2 liters  Tech

## 2017-03-13 NOTE — IP AVS SNAPSHOT
Current Discharge Medication List  
  
START taking these medications Dose & Instructions Dispensing Information Comments Morning Noon Evening Bedtime  
 acetaminophen 325 mg tablet Commonly known as:  TYLENOL Your last dose was: Your next dose is:    
   
   
 Dose:  650 mg Take 2 Tabs by mouth every six (6) hours for 14 days. Quantity:  112 Tab Refills:  0  
     
   
   
   
  
 aspirin delayed-release 325 mg tablet Your last dose was: Your next dose is:    
   
   
 Dose:  325 mg Take 1 Tab by mouth two (2) times a day for 30 days. Quantity:  60 Tab Refills:  0  
     
   
   
   
  
 famotidine 20 mg tablet Commonly known as:  PEPCID Your last dose was: Your next dose is:    
   
   
 Dose:  20 mg Take 1 Tab by mouth two (2) times a day for 30 days. Quantity:  60 Tab Refills:  0  
     
   
   
   
  
 oxyCODONE IR 5 mg immediate release tablet Commonly known as:  Charolet Music Your last dose was: Your next dose is:    
   
   
 Dose:  5-10 mg Take 1-2 Tabs by mouth every three (3) hours as needed. Max Daily Amount: 80 mg.  
 Quantity:  80 Tab Refills:  0  
     
   
   
   
  
 polyethylene glycol 17 gram packet Commonly known as:  Bipin Sport Your last dose was: Your next dose is:    
   
   
 Dose:  17 g Take 1 Packet by mouth daily as needed (constipation) for up to 15 days. Quantity:  15 Packet Refills:  0  
     
   
   
   
  
 senna-docusate 8.6-50 mg per tablet Commonly known as:  Lakeisha Boone Your last dose was: Your next dose is:    
   
   
 Dose:  1 Tab Take 1 Tab by mouth daily. Quantity:  30 Tab Refills:  0 CONTINUE these medications which have NOT CHANGED Dose & Instructions Dispensing Information Comments Morning Noon Evening Bedtime BACTROBAN NASAL 2 % nasal ointment Generic drug:  mupirocin calcium Your last dose was: Your next dose is:    
   
   
 by Both Nostrils route two (2) times a day. Pt to start 03/02/2017 BID X 5 days for Apparent Staph. Refills:  0  
     
   
   
   
  
 COSOPT 22.3-6.8 mg/mL ophthalmic solution Generic drug:  dorzolamide-timolol Your last dose was: Your next dose is:    
   
   
 Dose:  1 Drop Administer 1 Drop to left eye two (2) times a day. Refills:  0 EPINEPHrine 0.3 mg/0.3 mL injection Commonly known as:  Dorthea Mutter Your last dose was: Your next dose is:    
   
   
 Dose:  0.3 mg  
0.3 mL by IntraMUSCular route once as needed for up to 1 dose. Quantity:  1 Syringe Refills:  0 LIPITOR 10 mg tablet Generic drug:  atorvastatin Your last dose was: Your next dose is:    
   
   
 Dose:  10 mg Take 10 mg by mouth daily. Refills:  0 LUMIGAN 0.03 % ophthalmic drops Generic drug:  bimatoprost  
   
Your last dose was: Your next dose is:    
   
   
 Dose:  1 Drop Administer 1 Drop to left eye nightly. Refills:  0  
     
   
   
   
  
 metFORMIN 1,000 mg tablet Commonly known as:  GLUCOPHAGE Your last dose was: Your next dose is:    
   
   
 Dose:  1000 mg Take 1,000 mg by mouth two (2) times daily (with meals). Refills:  0 PAXIL 20 mg tablet Generic drug:  PARoxetine Your last dose was: Your next dose is:    
   
   
 Dose:  20 mg Take 20 mg by mouth daily. Refills:  0  
     
   
   
   
  
 terazosin 10 mg capsule Commonly known as:  HYTRIN Your last dose was: Your next dose is:    
   
   
 Dose:  10 mg Take 10 mg by mouth nightly. Refills:  0  
     
   
   
   
  
 valsartan-hydroCHLOROthiazide 320-12.5 mg per tablet Commonly known as:  DIOVAN-HCT Your last dose was: Your next dose is:    
   
   
 Dose:  1 Tab Take 1 Tab by mouth daily. Indications: hypertension Refills:  0 STOP taking these medications   
 ibuprofen 200 mg tablet Commonly known as:  MOTRIN Where to Get Your Medications Information on where to get these meds will be given to you by the nurse or doctor. ! Ask your nurse or doctor about these medications  
  acetaminophen 325 mg tablet  
 aspirin delayed-release 325 mg tablet  
 famotidine 20 mg tablet  
 oxyCODONE IR 5 mg immediate release tablet  
 polyethylene glycol 17 gram packet  
 senna-docusate 8.6-50 mg per tablet

## 2017-03-13 NOTE — IP AVS SNAPSHOT
[address] [facility] 
[phone] Patient: Nupur Marquez MRN: [id number] ALIYAH:[birth year] You are allergic to the following No active allergies Recent Documentation Height Weight BMI Smoking Status 1.791 m 89 kg 27.76 kg/m2 Former Smoker Emergency Contacts Name Discharge Info Relation Home Work Mobile SylCrista rogers DISCHARGE CAREGIVER [3] Spouse [3] [phone] About your hospitalization You were admitted on:  March 13, 2017 You last received care in the:  [facility] You were discharged on:  March 15, 2017 Unit phone number:  [phone] Why you were hospitalized Your primary diagnosis was:  Not on File Your diagnoses also included:  Localized Primary Osteoarthritis Of Left Lower Leg, Primary Localized Osteoarthritis Of Left Knee Providers Seen During Your Hospitalizations Provider Role Specialty Primary office phone Betty Austin MD Attending Provider Orthopedic Surgery [phone] Your Primary Care Physician (PCP) Primary Care Physician Office Phone Office [facility]ax [facility] [phone] Follow-up Information Follow up With Details Comments Contact Info Betty Austin MD On 3/27/2017 @ 3 pm [facility] [address] [facility] 
[phone] Eduarda Monzon MD   [address] [place]. [id number] [phone] [facility] On 3/15/2017 This is the provider of your home health needs. If you do not hear from them within 24 hours post discharge, please give them a call. [address] [place] [address] 
[phone] Current Discharge Medication List  
  
START taking these medications Dose & Instructions Dispensing Information Comments Morning Noon Evening Bedtime acetaminophen 325 mg tablet Commonly known as:  TYLENOL Your last dose was: Your next dose is:    
   
   
 Dose:  650 mg Take 2 Tabs by mouth every six (6) hours for 14 days. Quantity:  112 Tab Refills:  0  
     
   
   
   
  
 aspirin delayed-release 325 mg tablet Your last dose was: Your next dose is:    
   
   
 Dose:  325 mg Take 1 Tab by mouth two (2) times a day for 30 days. Quantity:  60 Tab Refills:  0  
     
   
   
   
  
 famotidine 20 mg tablet Commonly known as:  PEPCID Your last dose was: Your next dose is:    
   
   
 Dose:  20 mg Take 1 Tab by mouth two (2) times a day for 30 days. Quantity:  60 Tab Refills:  0  
     
   
   
   
  
 oxyCODONE IR 5 mg immediate release tablet Commonly known as:  Saurabh Rattler Your last dose was: Your next dose is:    
   
   
 Dose:  5-10 mg Take 1-2 Tabs by mouth every three (3) hours as needed. Max Daily Amount: 80 mg.  
 Quantity:  80 Tab Refills:  0  
     
   
   
   
  
 polyethylene glycol 17 gram packet Commonly known as:  Kreg Patron Your last dose was: Your next dose is:    
   
   
 Dose:  17 g Take 1 Packet by mouth daily as needed (constipation) for up to 15 days. Quantity:  15 Packet Refills:  0  
     
   
   
   
  
 senna-docusate 8.6-50 mg per tablet Commonly known as:  Pina Payton Your last dose was: Your next dose is:    
   
   
 Dose:  1 Tab Take 1 Tab by mouth daily. Quantity:  30 Tab Refills:  0 CONTINUE these medications which have NOT CHANGED Dose & Instructions Dispensing Information Comments Morning Noon Evening Bedtime BACTROBAN NASAL 2 % nasal ointment Generic drug:  mupirocin calcium Your last dose was: Your next dose is:    
   
   
 by Both Nostrils route two (2) times a day. Pt to start 03/02/2017 BID X 5 days for Apparent Staph. Refills:  0 COSOPT 22.3-6.8 mg/mL ophthalmic solution Generic drug:  dorzolamide-timolol Your last dose was: Your next dose is:    
   
   
 Dose:  1 Drop Administer 1 Drop to left eye two (2) times a day. Refills:  0 EPINEPHrine 0.3 mg/0.3 mL injection Commonly known as:  Justin Anuelle Your last dose was: Your next dose is:    
   
   
 Dose:  0.3 mg  
0.3 mL by IntraMUSCular route once as needed for up to 1 dose. Quantity:  1 Syringe Refills:  0 LIPITOR 10 mg tablet Generic drug:  atorvastatin Your last dose was: Your next dose is:    
   
   
 Dose:  10 mg Take 10 mg by mouth daily. Refills:  0 LUMIGAN 0.03 % ophthalmic drops Generic drug:  bimatoprost  
   
Your last dose was: Your next dose is:    
   
   
 Dose:  1 Drop Administer 1 Drop to left eye nightly. Refills:  0  
     
   
   
   
  
 metFORMIN 1,000 mg tablet Commonly known as:  GLUCOPHAGE Your last dose was: Your next dose is:    
   
   
 Dose:  1000 mg Take 1,000 mg by mouth two (2) times daily (with meals). Refills:  0 PAXIL 20 mg tablet Generic drug:  PARoxetine Your last dose was: Your next dose is:    
   
   
 Dose:  20 mg Take 20 mg by mouth daily. Refills:  0  
     
   
   
   
  
 terazosin 10 mg capsule Commonly known as:  HYTRIN Your last dose was: Your next dose is:    
   
   
 Dose:  10 mg Take 10 mg by mouth nightly. Refills:  0  
     
   
   
   
  
 valsartan-hydroCHLOROthiazide 320-12.5 mg per tablet Commonly known as:  DIOVAN-HCT Your last dose was: Your next dose is:    
   
   
 Dose:  1 Tab Take 1 Tab by mouth daily. Indications: hypertension Refills:  0 STOP taking these medications   
 ibuprofen 200 mg tablet Commonly known as:  MOTRIN  
 Where to Get Your Medications Information on where to get these meds will be given to you by the nurse or doctor. ! Ask your nurse or doctor about these medications  
  acetaminophen 325 mg tablet  
 aspirin delayed-release 325 mg tablet  
 famotidine 20 mg tablet  
 oxyCODONE IR 5 mg immediate release tablet  
 polyethylene glycol 17 gram packet  
 senna-docusate 8.6-50 mg per tablet Discharge Instructions Dann Rodriguez Surgery: Total Knee Replacement Surgeon:   Jesus Mejia MD 
Surgery Date:  3/13/2017 ? To relieve pain: ? Use ice/gel packs. ? Put the ice pack directly over the wound, or anywhere you are hurting or swollen. ? To control pain and swelling, keep ice on regularly, especially after physical activity. ? The packs should stay cold for 3-4 hours. When it is not cold anymore, rotate with the packs in the freezer. ? Elevate your leg. This will also keep swelling down. ? Rest for at least 20 minutes between activity or exercises. ? To keep track of your pain medications, write down what you take and when you take it. ? The last dose of pain medication you got in the hospital was:    
 
Medication Dose Date & Time ? Choose your medications based on the pain scale below: ? To keep your pain under control, take Tylenol every 6 hours for 14 days - even if you feel like you dont need it. ? For mild to moderate pain (1-6 on pain scale), take one pain pill every 3-4 hours as needed. ? For severe pain (7-10 on pain scale), take two pain pills every 3-4 hours as needed. ? To prevent nausea, take your pain medications with food. Pain Scale ? As your pain lessens: 
 
? Slowly start taking less pain medication. You may do this by waiting longer between doses or by taking smaller doses. ? Stop using the pain medications as soon as you no longer need it, usually in 2-3 weeks. ? Aspirin ? To prevent blood clots, you will need to take Aspirin 325 mg twice a day for 30 days. ? To prevent stomach upset or bleeding: ? Do not take non-steroidal anti-inflammatory medications (Ibuprofen, Advil, Motrin, Naproxen, etc.) ? Take Pepcid 20 mg twice a day, or a similar home medication, while you are taking a blood thinner. OPSITE (Honeycomb dressing) ? Keep your clear, waterproof dressing in place for 5-7 days after your leave the hospital. 
 
? If you are still having drainage, you will need to change your dressing in 5-7 days. You will be given one extra dressing to use at home. ? If there is no more drainage from the wound, you may leave it open to the air. OPSITE DRESSING INSTRUCTIONS ? To increase and promote healing: 
? Stop Smoking (or at least cut back on 
     Smoking). ? Eat a well-balanced diet (high in protein 
     and vitamin C). ? If you have a poor appetite, drink Ensure, Glucerna, or  
      [place] Instant Breakfast for the next 30 days. ? If you are diabetic, you should control your blood  
      sugars to prevent infection and help your wound  
      to heal. 
               
 
 
 
? To prevent constipation, stay active and drink plenty of fluid. ? While using pain medications, you should also take stool softeners and laxatives, such as Pericolace 
     and Miralax. ? If you are having too many bowel Movements, then you may need 
     to stop taking the laxatives. ? You should have a bowel movement 3-4 days  
     after surgery and then at least every other day while 
     on pain medication. ? To improve your recovery, you must be active! ? Use your walker and take short walks (in your home)  
      about every 2 hours during the day. ? Try to increase how far you walk each day. ? You can put as much weight on your leg as you can tolerate while walking. ? To avoid getting a stiff knee, work on getting your knee bent and straight as soon as possible. ? Home health physical therapy will come to your 
      home a few times per week to teach you how to 
      get out of bed, to safely walk in your home, and 
      to do your exercises. ? NO DRIVING until your surgeon tells you it is ok. 
 
? You can return to work when cleared by a physician. ? Please call your physician immediately if you have: 
 
? Constant bleeding from your wound. ? Increasing redness or swelling around your wound (Some warmth, bruising and swelling is normal). ? Change in wound drainage (increase in amount, color, or bad smell). ? Change in mental status (unusual behavior ? Temperature over 101.5 degrees Fahrenheit ? Pain or redness in the calf (back of your lower leg  see picture) ? Increased swelling of the thigh, ankle, calf, or foot. ? Emergency: CALL 911 if you have: 
 
? Shortness of breath ? Chest pain when you cough or taking a deep breath ? Please call your surgeons office at [phone]  for a follow up appointment. _ 
? You should call as soon as you get home or the next day after discharge. Ask to make an appointment in 2 weeks. ? If you have questions or concerns during normal business hours, you may reach Dr. Meliza Lanza team at [phone]. Discharge Orders None [employer] Announcement We are excited to announce that we are making your provider's discharge notes available to you in Fact[employer]. You will see these notes when they are completed and signed by the physician that discharged you from your recent hospital stay.   If you have any questions or concerns about any information you see in Fact[employer], please call the [employer] Information Department where you were seen or reach out to your Primary Care Provider for more information about your plan of care. Introducing [facility]! [address] introduces [facility] patient portal. Now you can access parts of your medical record, email your doctor's office, and request medication refills online. 1. In your internet browser, go to [email or web address] 2. Click on the First Time User? Click Here link in the Sign In box. You will see the New Member Sign Up page. 3. Enter your [facility] Access Code exactly as it appears below. You will not need to use this code after youve completed the sign-up process. If you do not sign up before the expiration date, you must request a new code. · [facility] Access Code: [email or web address] Expires: 5/25/2017  4:55 PM 
 
4. Enter the last four digits of your Social Security Number (xxxx) and Date of Birth (mm/dd/yyyy) as indicated and click Submit. You will be taken to the next sign-up page. 5. Create a [facility] ID. This will be your [facility] login ID and cannot be changed, so think of one that is secure and easy to remember. 6. Create a [facility] password. You can change your password at any time. 7. Enter your Password Reset Question and Answer. This can be used at a later time if you forget your password. 8. Enter your e-mail address. You will receive e-mail notification when new information is available in [address] 9. Click Sign Up. You can now view and download portions of your medical record. 10. Click the Download Summary menu link to download a portable copy of your medical information. If you have questions, please visit the Frequently Asked Questions section of the [facility] website. Remember, [facility] is NOT to be used for urgent needs. For medical emergencies, dial 911. Now available from your iPhone and Android! General Information Please provide this summary of care documentation to your next provider. Patient Signature:  ____________________________________________________________ Date:  ____________________________________________________________  
  
Nati  Provider Signature:  ____________________________________________________________ Date:  ____________________________________________________________

## 2017-03-13 NOTE — OP NOTES
[address], [facility] [address]   OP NOTE       Name:  Toma Enriquez   MR#:  [id number]   :  1940   Account #:  [de-identified]    Surgery Date:  2017   Date of Adm:  2017       PREOPERATIVE DIAGNOSIS: Severe left knee osteoarthritis with   loose bodies. POSTOPERATIVE DIAGNOSIS: Severe left knee osteoarthritis with   loose bodies. PROCEDURES PERFORMED: Left total knee arthroplasty with   removal of loose body. SURGEON: Bob Moralez. Moni Walton MD.    FIRST ASSISTANT: Cici Pemberton RN. SPECIMENS REMOVED: None. ESTIMATED BLOOD LOSS: 150 mL. COMPLICATIONS: None. ANESTHESIA: Spinal.    DESCRIPTION OF PROCEDURE: The patient was brought to the   operating room following administration of a spinal anesthetic. The   tourniquet was placed on the left upper thigh and the left knee and   lower leg were prepped and draped in sterile fashion. Timeout and site   confirmation were carried out. The limb was esmarched with an   Esmarch bandage and tourniquet elevated to 250. An anterior knee incision followed by median parapatellar arthrotomy   was carried out. Menisci and anterior cruciate ligament were debrided   and the posterior cruciate ligament was debrided. The drill holes   placed in the proximal tibia and distal femur in line with the canals. Canal suctioned and irrigated and IM ramona introduced in the tibia for   alignment reference, and the external tibial cutting guide was   applied and set to take the appropriate amount of bone proximally with   the 0-degree bushing. The proximal bone cut was made and the tibial   bone plate removed. Attention was turned to the femur, where the distal femoral cutting   guide was set to take 10 mm of bone distally. The distal cut was made. Femur was sized at a 7 and the drill holes with 7 degrees of external   rotation were drilled to compensate for some posterolateral bone loss. The finishing block applied and the anterior, posterior, and chamfer   cuts were made. The notch cutting guide applied and the notch cut   made. At this time, remnant osteophytes and menisci were debrided. The   posterior capsule was injected with half our mixture of 0.25% Marcaine   with epinephrine solution, 30 mL, 30 of Toradol, 30 of saline. The other   half was injected in the subcutaneous tissue. The capsule at the end   the case. Attention was turned to the tibia, where the tibia was sized at an 8. The   proximal bridge applied, the proximal drill hole made, followed by the   keel punch. Trials were carried out and it was felt that a 10 poly would   give us the best fit. The patella was everted and 9.5 mm posterior   patellar cut was taken, sized at 41, and 3 drill holes made. Patellar   tracking was excellent. All trials were removed. The bony surfaces   were Pulsavaced and dried in Vacu-Mix cement with 2 grams   vancomycin in it, was used to cement in the above components. Once   the cement was dry, the tourniquet was let down, small bleeders Bovie   electrocauterized, excess bits of cement removed from around the   prosthetic components, and the permanent poly was impacted into   place. Medium Hemovac drain was placed. The capsule was closed   with figure-of-eight #1 Vicryl suture and a running #1 Stratafix suture. Subcutaneous tissue was closed with 2-0 Vicryl suture and a running   2-0 Stratafix suture. The skin was injected with our remaining mixture   of Marcaine with epinephrine. The skin was closed with skin staples. He was dressed with Adaptic, 4 x 4s, ABDs, sterile cast padding, Ace   wrap, and taken to the recovery room in stable condition.         MD Wesley Vazquez / [place]   D:  03/13/2017   09:42   T:  03/13/2017   10:38   Job #:  [id number]

## 2017-03-13 NOTE — PROGRESS NOTES
Problem: Mobility Impaired (Adult and Pediatric)  Goal: *Acute Goals and Plan of Care (Insert Text)  Physical Therapy Goals  Initiated 3/13/2017    1. Patient will move from supine to sit and sit to supine , scoot up and down and roll side to side in bed with modified independence within 4 days. 2. Patient will perform sit to stand with modified independence within 4 days. 3. Patient will ambulate with modified independence for 250 feet with the least restrictive device within 4 days. 4. Patient will ascend/descend 7 stairs with 1 handrail(s) with modified independence within 4 days. 5. Patient will perform home exercise program per protocol with independence within 4 days. 6. Patient will demonstrate AROM 0-90 degrees in operative joint within 4 days. PHYSICAL THERAPY EVALUATION  Patient: Evert Robles (12 y.o. male)  Date: 3/13/2017  Primary Diagnosis: LEFT KNEE ARTHRITIS  Localized primary osteoarthritis of left lower leg  Primary localized osteoarthritis of left knee  Procedure(s) (LRB):  LEFT TOTAL KNEE REPLACMENT (Left) Day of Surgery   Precautions:   WBAT      ASSESSMENT :  Based on the objective data described below, the patient presents with impaired functional mobility secondary to orthostatic hypotension in addition to decreased strength and ROM in L LE on POD 0 following L TKR. Pt received supine in bed, agreeable to participation with therapy. Vital signs assessed throughout position changed and are documented below. Pt assumed seated position EOB at supervision level w/ intact sitting balance EOB. Pt c/o mild dizziness EOB w/ BP found to be 88/46, HR 62 BPM. Pt sit>>stand w/ CGA, again c/o increased dizziness in static stance. BP found to have decreased to 65/43. Further mobility deemed unsafe.  Pt sidestepped 4-5 steps along EOB w/ RW and CGA, exhibiting antalgic, shuffled gait pattern however no overt LOB noted during limited distance ambulation trial. BP found to have stabilized to 98/58 once pt returned to supine position. Anticipate that once orthostatic hypotension resolves, pt will progress well with mobility and be appropriate to discharge home w/ [facility] PT and assistance of wife. Pt will need RW prior to discharge. Supine, at rest: 103/51  Seated EOB: 88/46  Standing EOB: 65/43  Returned to supine: 98/58. Patient will benefit from skilled intervention to address the above impairments. Patients rehabilitation potential is considered to be Good  Factors which may influence rehabilitation potential include:   [ ]         None noted  [ ]         Mental ability/status  [ ]         Medical condition  [ ]         Home/family situation and support systems  [ ]         Safety awareness  [ ]         Pain tolerance/management  [X]         Other: orthostatic hypotension       PLAN :  Recommendations and Planned Interventions:  [X]           Bed Mobility Training             [ ]    Neuromuscular Re-Education  [X]           Transfer Training                   [ ]    Orthotic/Prosthetic Training  [X]           Gait Training                         [ ]    Modalities  [X]           Therapeutic Exercises           [ ]    Edema Management/Control  [X]           Therapeutic Activities            [X]    Patient and Family Training/Education  [X]           Other (comment): stair climbing     Frequency/Duration: Patient will be followed by physical therapy  twice daily to address goals. Discharge Recommendations: Home Health  Further Equipment Recommendations for Discharge: rolling walker       SUBJECTIVE:   Patient stated I'm excited to get up.       OBJECTIVE DATA SUMMARY:   HISTORY:    Past Medical History:   Diagnosis Date    Arthritis      Diabetes ([address]      Hypercholesteremia      Hypertension      Ill-defined condition       clausterphobia     Past Surgical History:   Procedure Laterality Date    HX CYST REMOVAL        HX KNEE ARTHROSCOPY Left      HX ORTHOPAEDIC         carpal tunnel surgery     Prior Level of Function/Home Situation: Independent w/ ambulation, ADLs, and reports history of 1 fall. Lives with wife. Retired. Still driving. Reports doing yardwork and housework on a daily basis. Personal factors and/or comorbidities impacting plan of care:      Home Situation  Home Environment: Private residence  # Steps to Enter: 7  Rails to Enter: Yes  Hand Rails : Left  One/Two Story Residence: Two story, live on 1st floor  # of Interior Steps: 14  Interior Rails: Both  Lift Chair Available: No  Living Alone: Yes  Support Systems: Spouse/Significant Other/Partner  Patient Expects to be Discharged to[de-identified] Private residence  Current DME Used/Available at Home: None  Tub or Shower Type: Shower     EXAMINATION/PRESENTATION/DECISION MAKING:   Critical Behavior:  Neurologic State: Drowsy, Eyes open to stimulus  Orientation Level: Oriented to person, Oriented to place, Oriented to situation  Cognition: Follows commands     Hearing: Auditory  Auditory Impairment: Hard of hearing, bilateral  Hearing Aids/Status: Bilateral  Skin:  Dressing clean ,dry,  Intact   Edema: none noted   Range Of Motion:  AROM: Generally decreased, functional                       Strength:    Strength: Generally decreased, functional                    Tone & Sensation:   Tone: Normal              Sensation: Intact               Coordination:  Coordination: Within functional limits  Vision:      Functional Mobility:  Bed Mobility:  Rolling: Supervision  Supine to Sit: Supervision  Sit to Supine: Supervision  Scooting: Supervision  Transfers:  Sit to Stand: Contact guard assistance  Stand to Sit: Contact guard assistance                       Balance:   Sitting: Intact  Standing: Intact; With support  Ambulation/Gait Training:  Distance (ft): 1 Feet (ft) (sidestepped 4-5 steps along EOB)  Assistive Device: Gait belt;Walker, rolling  Ambulation - Level of Assistance: Contact guard assistance        Gait Abnormalities: Antalgic Base of Support: Widened     Speed/Shelly: Pace decreased (<100 feet/min); Shuffled  Step Length: Left shortened;Right shortened                             Pt sidestepped 4-5 steps along EOB w/ RW and CGA, exhibiting shuffled, antalgic gait pattern however no LOB or difficulty noted over limited distance ambulation trial.      Functional Measure:  Barthel Index:      Bathin  Bladder: 10  Bowels: 10  Groomin  Dressin  Feeding: 10  Mobility: 0  Stairs: 0  Toilet Use: 5  Transfer (Bed to Chair and Back): 10  Total: 55         Barthel and G-code impairment scale:  Percentage of impairment CH  0% CI  1-19% CJ  20-39% CK  40-59% CL  60-79% CM  80-99% CN  100%   Barthel Score 0-100 100 99-80 79-60 59-40 20-39 1-19    0   Barthel Score 0-20 20 17-19 13-16 9-12 5-8 1-4 0      The Barthel ADL Index: Guidelines  1. The index should be used as a record of what a patient does, not as a record of what a patient could do. 2. The main aim is to establish degree of independence from any help, physical or verbal, however minor and for whatever reason. 3. The need for supervision renders the patient not independent. 4. A patient's performance should be established using the best available evidence. Asking the patient, friends/relatives and nurses are the usual sources, but direct observation and common sense are also important. However direct testing is not needed. 5. Usually the patient's performance over the preceding 24-48 hours is important, but occasionally longer periods will be relevant. 6. Middle categories imply that the patient supplies over 50 per cent of the effort. 7. Use of aids to be independent is allowed. Filippo Rios., Barthel, D.W. (3010). Functional evaluation: the Barthel Index. [address] (14)2. LEIGHANN Devine, Enriqueta Mcconnell., Sherylgary Hoyt., Bonna Meckel, [address] ().  Measuring the change indisability after inpatient rehabilitation; comparison of the responsiveness of the Barthel Index and Functional Imperial Measure. Journal of Neurology, Neurosurgery, and Psychiatry, 66(4), 632-511. GINA Marin, KELL Bliss, & Danielle Ba M.A. (2004.) Assessment of post-stroke quality of life in cost-effectiveness studies: The usefulness of the Barthel Index and the EuroQoL-5D. Quality of Life Research, 13, 021-17         G codes: In compliance with CMSs Claims Based Outcome Reporting, the following G-code set was chosen for this patient based on their primary functional limitation being treated: The outcome measure chosen to determine the severity of the functional limitation was the Barthel Index with a score of 55/100 which was correlated with the impairment scale. · Mobility - Walking and Moving Around:               - CURRENT STATUS:    CK - 40%-59% impaired, limited or restricted               - GOAL STATUS:           CI - 1%-19% impaired, limited or restricted               - D/C STATUS:                       ---------------To be determined---------------      Physical Therapy Evaluation Charge Determination   History Examination Presentation Decision-Making   MEDIUM  Complexity : 1-2 comorbidities / personal factors will impact the outcome/ POC  MEDIUM Complexity : 3 Standardized tests and measures addressing body structure, function, activity limitation and / or participation in recreation  MEDIUM Complexity : Evolving with changing characteristics  MEDIUM Complexity : FOTO score of 26-74      Based on the above components, the patient evaluation is determined to be of the following complexity level: MEDIUM     Pain:  Pain Scale 1: Numeric (0 - 10)  Pain Intensity 1: 0           Pain Intervention(s) 1: Cold pack  Activity Tolerance:   Orthostatic to 65/43 in standing w/ pt c/o increased dizziness/lightheadedness.  BP stabilized to 98/58 once returned to supine position in bed  Please refer to the flowsheet for vital signs taken during this treatment. After treatment:   [ ]         Patient left in no apparent distress sitting up in chair  [X]         Patient left in no apparent distress in bed  [X]         Call bell left within reach  [X]         Nursing notified  [X]         Caregiver present  [ ]         Bed alarm activated      COMMUNICATION/EDUCATION:   The patients plan of care was discussed with: Registered Nurse.  [X]         Fall prevention education was provided and the patient/caregiver indicated understanding. [X]         Patient/family have participated as able in goal setting and plan of care. [X]         Patient/family agree to work toward stated goals and plan of care. [ ]         Patient understands intent and goals of therapy, but is neutral about his/her participation. [ ]         Patient is unable to participate in goal setting and plan of care.      Thank you for this referral.  William Ashley, PT, DPT   Time Calculation: 18 mins

## 2017-03-13 NOTE — BRIEF OP NOTE
BRIEF OPERATIVE NOTE    Date of Procedure: 3/13/2017   Preoperative Diagnosis: LEFT KNEE OSTEO ARTHRITIS  Postoperative Diagnosis: LEFT KNEE OSTEO ARTHRITIS    Procedure(s):  LEFT TOTAL KNEE REPLACMENT  Surgeon(s) and Role:     * Ju Donnelly MD - Primary 1st Assistant- Viraj Rae R.N.            Surgical Staff:  Circ-1: Rosales Garcia RN  Circ-Relief: Nils Zavala  Circ-Intern: Pramod James RN  Scrub Tech-1: Shireen Cantrell  Surg Asst-1: Edita Briceno RN  Float Staff: Diego Matthews RN  Event Time In   Incision Start 4296   Incision Close      Anesthesia: Spinal   Estimated Blood Loss: 150 cc  Specimens:   ID Type Source Tests Collected by Time Destination   1 : Portions of Left femoral and tibial condyles and patella Bone Knee   Ju Donnelly MD 3/13/2017 9987 Discarded      Findings: DJD   Complications: None  Implants:   Implant Name Type Inv.  Item Serial No.  Lot No. LRB No. Used Action   CEMENT BNE MV SMARTSET 40GM --  - SN/A  CEMENT BNE MV SMARTSET 40GM --  N/A [facility] [employer] [id number] Left 2 Implanted   FEM PS SZ 7 LT MIKE -- ATTUNE - SN/A  FEM PS SZ 7 LT MIKE -- ATTUNE N/A [facility] [employer] [id number] Left 1 Implanted   PAT MIKE DOME MEDIAL 41MM -- ATTUNE - SN/A  PAT MIKE DOME MEDIAL 41MM -- ATTUNE N/A [facility] [employer] [id number] Left 1 Implanted   BASE TIB FB SZ 8 MIKE -- ATTUNE - SN/A  BASE TIB FB SZ 8 MIKE -- ATTUNE N/A [facility] [employer] [id number] Left 1 Implanted   INSERT TIB FB PS SZ 7 10MM -- ATTUNE - SN/A   INSERT TIB FB PS SZ 7 10MM -- ATTUNE N/A [facility] [employer] [id number] Left 1 Implanted

## 2017-03-13 NOTE — ANESTHESIA PROCEDURE NOTES
Spinal Block    Performed by: Miguel Orta  Authorized by: Miguel Orta     Pre-procedure: Indications: primary anesthetic  Preanesthetic Checklist: risks and benefits discussed and timeout performed      Spinal Block:   Patient Position:  Seated  Prep Region:  Lumbar  Prep: chlorhexidine      Location:  L3-4  Technique:  Single shot        Needle:   Needle Type:  Pencan  Needle Gauge:  25 G  Attempts:  1      Events: CSF confirmed, no blood with aspiration and no paresthesia        Assessment:  Insertion:  Uncomplicated  Patient tolerance:  Patient tolerated the procedure well with no immediate complications  CSE needle set used. Accidental dural puncture with 17G Tuohy needle. 12 mg bupivacaine given intrathecally.

## 2017-03-14 LAB
GLUCOSE BLD STRIP.AUTO-MCNC: 116 MG/DL (ref 65–100)
GLUCOSE BLD STRIP.AUTO-MCNC: 138 MG/DL (ref 65–100)
GLUCOSE BLD STRIP.AUTO-MCNC: 151 MG/DL (ref 65–100)
GLUCOSE BLD STRIP.AUTO-MCNC: 177 MG/DL (ref 65–100)
HGB BLD-MCNC: 9.1 G/DL (ref 12.1–17)
SERVICE CMNT-IMP: ABNORMAL

## 2017-03-14 PROCEDURE — 65270000029 HC RM PRIVATE

## 2017-03-14 PROCEDURE — 74011250636 HC RX REV CODE- 250/636: Performed by: NURSE PRACTITIONER

## 2017-03-14 PROCEDURE — 36415 COLL VENOUS BLD VENIPUNCTURE: CPT | Performed by: ORTHOPAEDIC SURGERY

## 2017-03-14 PROCEDURE — 74011000250 HC RX REV CODE- 250: Performed by: ORTHOPAEDIC SURGERY

## 2017-03-14 PROCEDURE — 82962 GLUCOSE BLOOD TEST: CPT

## 2017-03-14 PROCEDURE — 85018 HEMOGLOBIN: CPT | Performed by: ORTHOPAEDIC SURGERY

## 2017-03-14 PROCEDURE — 74011250637 HC RX REV CODE- 250/637: Performed by: ORTHOPAEDIC SURGERY

## 2017-03-14 PROCEDURE — 97530 THERAPEUTIC ACTIVITIES: CPT

## 2017-03-14 PROCEDURE — 97116 GAIT TRAINING THERAPY: CPT

## 2017-03-14 PROCEDURE — 74011250636 HC RX REV CODE- 250/636: Performed by: ORTHOPAEDIC SURGERY

## 2017-03-14 PROCEDURE — 97110 THERAPEUTIC EXERCISES: CPT

## 2017-03-14 RX ADMIN — ACETAMINOPHEN 650 MG: 325 TABLET, FILM COATED ORAL at 23:57

## 2017-03-14 RX ADMIN — DOCUSATE SODIUM AND SENNOSIDES 1 TABLET: 8.6; 5 TABLET, FILM COATED ORAL at 09:56

## 2017-03-14 RX ADMIN — ACETAMINOPHEN 650 MG: 325 TABLET, FILM COATED ORAL at 18:29

## 2017-03-14 RX ADMIN — PAROXETINE 20 MG: 20 TABLET, FILM COATED ORAL at 09:56

## 2017-03-14 RX ADMIN — ASPIRIN 325 MG: 325 TABLET, COATED ORAL at 18:29

## 2017-03-14 RX ADMIN — DORZOLAMIDE HYDROCHLORIDE AND TIMOLOL MALEATE 1 DROP: 20; 5 SOLUTION/ DROPS OPHTHALMIC at 12:25

## 2017-03-14 RX ADMIN — FAMOTIDINE 20 MG: 20 TABLET ORAL at 09:55

## 2017-03-14 RX ADMIN — FAMOTIDINE 20 MG: 20 TABLET ORAL at 18:28

## 2017-03-14 RX ADMIN — KETOROLAC TROMETHAMINE 15 MG: 30 INJECTION, SOLUTION INTRAMUSCULAR at 06:20

## 2017-03-14 RX ADMIN — Medication 10 ML: at 21:43

## 2017-03-14 RX ADMIN — OXYCODONE HYDROCHLORIDE 10 MG: 5 TABLET ORAL at 06:20

## 2017-03-14 RX ADMIN — ATORVASTATIN CALCIUM 10 MG: 10 TABLET, FILM COATED ORAL at 09:56

## 2017-03-14 RX ADMIN — TERAZOSIN HYDROCHLORIDE 10 MG: 5 CAPSULE ORAL at 21:43

## 2017-03-14 RX ADMIN — DOCUSATE SODIUM AND SENNOSIDES 1 TABLET: 8.6; 5 TABLET, FILM COATED ORAL at 18:29

## 2017-03-14 RX ADMIN — Medication 10 ML: at 06:22

## 2017-03-14 RX ADMIN — ACETAMINOPHEN 650 MG: 325 TABLET, FILM COATED ORAL at 12:28

## 2017-03-14 RX ADMIN — DEXAMETHASONE SODIUM PHOSPHATE 10 MG: 4 INJECTION, SOLUTION INTRAMUSCULAR; INTRAVENOUS at 09:55

## 2017-03-14 RX ADMIN — OXYCODONE HYDROCHLORIDE 10 MG: 5 TABLET ORAL at 02:33

## 2017-03-14 RX ADMIN — ASPIRIN 325 MG: 325 TABLET, COATED ORAL at 09:55

## 2017-03-14 RX ADMIN — METFORMIN HYDROCHLORIDE 1000 MG: 500 TABLET, FILM COATED ORAL at 09:55

## 2017-03-14 RX ADMIN — ACETAMINOPHEN 650 MG: 325 TABLET, FILM COATED ORAL at 06:20

## 2017-03-14 RX ADMIN — KETOROLAC TROMETHAMINE 15 MG: 30 INJECTION, SOLUTION INTRAMUSCULAR at 12:29

## 2017-03-14 RX ADMIN — Medication 10 ML: at 16:42

## 2017-03-14 RX ADMIN — METFORMIN HYDROCHLORIDE 1000 MG: 500 TABLET, FILM COATED ORAL at 18:29

## 2017-03-14 RX ADMIN — POLYETHYLENE GLYCOL 3350 17 G: 17 POWDER, FOR SOLUTION ORAL at 09:57

## 2017-03-14 RX ADMIN — DORZOLAMIDE HYDROCHLORIDE AND TIMOLOL MALEATE 1 DROP: 20; 5 SOLUTION/ DROPS OPHTHALMIC at 19:22

## 2017-03-14 RX ADMIN — SODIUM CHLORIDE 500 ML: 900 INJECTION, SOLUTION INTRAVENOUS at 09:07

## 2017-03-14 NOTE — PROGRESS NOTES
Orthopaedic Progress Note  Post Op day: 1 Day Post-Op    2017 8:08 AM   Susan Barragan       [id number]  SUBJECTIVE:     Susan Barragan is a 68 y.o. male s/p a left Procedure(s):  LEFT TOTAL KNEE REPLACMENT . Patients pain level is 3. No complaints. OBJECTIVE:       Physical Exam:  General: alert, cooperative, no distress. Gastrointestinal:  Soft, non-tender. Neurological:Neurovascular exam within normal limits. Dressing/Wound:  Clean, dry and intact. No significant erythema or swelling.   Able to do SLR  Vital Signs:       Patient Vitals for the past 8 hrs:   BP Pulse Resp SpO2   17 0234 108/60 (!) 58 18 96 %                                          Temp (24hrs), Av.6 °F (36.4 °C), Min:97 °F (36.1 °C), Max:98.2 °F (36.8 °C)    Labs:        Recent Labs      17   0445   HGB  9.1*     Lab Results   Component Value Date/Time    Sodium 141 2017 09:33 AM    Potassium 4.3 2017 09:33 AM    Chloride 104 2017 09:33 AM    CO2 28 2017 09:33 AM    Glucose 128 2017 09:33 AM    BUN 17 2017 09:33 AM    Creatinine 0.79 2017 09:33 AM    Calcium 8.3 2017 09:33 AM     PT/OT:        Gait  Base of Support: Widened  Speed/Shelly: Pace decreased (<100 feet/min), Shuffled  Step Length: Left shortened, Right shortened  Gait Abnormalities: Antalgic  Ambulation - Level of Assistance: Contact guard assistance  Distance (ft): 1 Feet (ft) (sidestepped 4-5 steps along EOB)  Assistive Device: Gait belt, Walker, rolling       Patient mobility  Bed Mobility Training  Rolling: Supervision  Supine to Sit: Supervision  Sit to Supine: Supervision  Scooting: Supervision  Transfer Training  Sit to Stand: Contact guard assistance  Stand to Sit: Contact guard assistance      Gait Training  Assistive Device: Gait belt, Walker, rolling  Ambulation - Level of Assistance: Contact guard assistance  Distance (ft): 1 Feet (ft) (sidestepped 4-5 steps along EOB) ASSESSMENT / PLAN:   Active Problems:    Localized primary osteoarthritis of left lower leg (3/13/2017)      Primary localized osteoarthritis of left knee (3/13/2017)                  Assessment           Plan   1. S/P left TKR   1. PT/OT/Rehab  2.  mg bid  3.   [x]    HH PT,OT,RN      []   SAH/Rehab      Signed By: Carlos Bear MD

## 2017-03-14 NOTE — PROGRESS NOTES
Problem: Mobility Impaired (Adult and Pediatric)  Goal: *Acute Goals and Plan of Care (Insert Text)  Physical Therapy Goals  Initiated 3/13/2017    1. Patient will move from supine to sit and sit to supine , scoot up and down and roll side to side in bed with modified independence within 4 days. 2. Patient will perform sit to stand with modified independence within 4 days. 3. Patient will ambulate with modified independence for 250 feet with the least restrictive device within 4 days. 4. Patient will ascend/descend 7 stairs with 1 handrail(s) with modified independence within 4 days. 5. Patient will perform home exercise program per protocol with independence within 4 days. 6. Patient will demonstrate AROM 0-90 degrees in operative joint within 4 days. PHYSICAL THERAPY TREATMENT  Patient: Nupur Marquez (36 y.o. male)  Date: 3/14/2017  Diagnosis: LEFT KNEE ARTHRITIS  Localized primary osteoarthritis of left lower leg  Primary localized osteoarthritis of left knee <principal problem not specified>  Procedure(s) (LRB):  LEFT TOTAL KNEE REPLACMENT (Left) 1 Day Post-Op  Precautions: WBAT ; Falls      ASSESSMENT:  Pt Norberto Valadez presents with improving BP stability and progressing mobility. He demonstrates impulsivity and decreased safety awareness with gait, requiring frequent cues for attention and safety. He denies lightheadedness with ambulation but later indicates he did feel lightheaded when ambulating. Pt eager to progress mobility and participate with PT. Progression toward goals:  [X]      Improving appropriately and progressing toward goals  [ ]      Improving slowly and progressing toward goals  [ ]      Not making progress toward goals and plan of care will be adjusted       PLAN:  Patient continues to benefit from skilled intervention to address the above impairments. Continue treatment per established plan of care.   Discharge Recommendations:  Home Health  Further Equipment Recommendations for Discharge:  Rolling walker       SUBJECTIVE:   Patient stated I'm stubborn, re: compliance with education. OBJECTIVE DATA SUMMARY:   Critical Behavior:  Neurologic State: Alert, Appropriate for age  Orientation Level: Appropriate for age, Oriented X4  Cognition: Follows commands; impulsive, decreased safety awareness. Functional Mobility Training:  Bed Mobility:     Supine to Sit: Additional time;Supervision     Scooting: Supervision        Transfers:  Sit to Stand: Contact guard assistance  Stand to Sit: Contact guard assistance                             Balance:  Sitting: Without support  Sitting - Static: Good (unsupported)  Sitting - Dynamic: Good (unsupported)  Standing: With support  Standing - Static: Fair  Standing - Dynamic : Fair  Ambulation/Gait Training:  Distance (ft): 15 Feet (ft)  Assistive Device: Walker, rolling;Gait belt  Ambulation - Level of Assistance: Minimal assistance        Gait Abnormalities: Antalgic;Decreased step clearance        Base of Support: Widened     Speed/Shelly: Accelerated  Step Length: Left shortened                              cues for sequencing, attention to environment, walker approximation, pace.               Therapeutic Exercises:     EXERCISE   Sets   Reps   Active Active Assist   Passive Self ROM   Comments   Ankle Pumps 1 10 [X]                                        [ ]                                        [ ]                                        [ ]                                            Quad Sets     [ ]                                        [ ]                                        [ ]                                        [ ]                                            Hamstring Sets     [ ]                                        [ ]                                        [ ]                                        [ ]                                            Ez Bacon     [ ] [ ]                                        [ ]                                        [ ]                                            Ciara Pry       [ ]                                          [ ]                                          [ ]                                          [ ]                                            Heel Slides 1 5 [X]                                        [ ]                                        [ ]                                        [ ]                                            Samul Silence     [ ]                                        [ ]                                        [ ]                                        [ ]                                            Promise Carty     [ ]                                        [ ]                                        [ ]                                        [ ]                                            Gilda Stamp     [ ]                                        [ ]                                        [ ]                                        [ ]                                               Pain:  Pain Scale 1: Numeric (0 - 10)  Pain Intensity 1: 3  Pain Location 1: Knee  Pain Orientation 1: Left  Pain Description 1: Aching  Pain Intervention(s) 1: Ice  Activity Tolerance:   Fair for activities above. Please refer to the flowsheet for vital signs taken during this treatment.   After treatment:   [X] Patient left in no apparent distress sitting up in chair  [ ] Patient left in no apparent distress in bed  [X] Call bell left within reach  [X] Nursing notified  [ ] Caregiver present  [X] Bed alarm activated      COMMUNICATION/COLLABORATION:   The patients plan of care was discussed with: Registered Nurse and Rehabilitation Attendant     Partha Crowder PT, DPT   Time Calculation: 39 mins

## 2017-03-14 NOTE — PROGRESS NOTES
Ortho / Neurosurgery NP Note    POD# 1  s/p LEFT TOTAL KNEE REPLACMENT     Pt resting in bed. No complaints. VSS except hypotension (holding home med Diovan, received fluid bolus last night, BP again 77/35  Afebrile. Voiding status: + void in urinal    Labs  Lab Results   Component Value Date/Time    HGB 9.1 03/14/2017 04:45 AM      Lab Results   Component Value Date/Time    INR 1.1 02/28/2017 09:33 AM        Dressing c.d.i  Cryotherapy in place over incision  Drain removed this morning  Calves soft and supple; No pain with passive stretch  Sensation and motor intact  SCDs for mechanical DVT proph while in bed     PLAN:  1) PT BID, WBAT  2) Aspirin 325 mg PO BID for DVT Prophylaxis    3) Hypotension - second fluid bolus today,  BP improved after bolus this morning 108/61; continue to hold home BP meds  4) Plan d/c home with family - likely tomorrow.     Elijah Disla, [place]

## 2017-03-14 NOTE — DISCHARGE INSTRUCTIONS
Sharlette Boulder  Surgery: Total Knee Replacement  Surgeon:   Betty Austin MD  Surgery Date:  3/13/2017     To relieve pain:   Use ice/gel packs.  Put the ice pack directly over the wound, or anywhere you are hurting or swollen.  To control pain and swelling, keep ice on regularly, especially after physical activity.  The packs should stay cold for 3-4 hours. When it is not cold anymore, rotate with the packs in the freezer.  Elevate your leg. This will also keep swelling down.  Rest for at least 20 minutes between activity or exercises.  To keep track of your pain medications, write down what you take and when you take it.  The last dose of pain medication you got in the hospital was:       Medication    Dose    Date & Time             Choose your medications based on the pain scale below:     To keep your pain under control, take Tylenol every 6 hours for 14 days - even if you feel like you dont need it.  For mild to moderate pain (1-6 on pain scale), take one pain pill every 3-4 hours as needed.  For severe pain (7-10 on pain scale), take two pain pills every 3-4 hours as needed.  To prevent nausea, take your pain medications with food. Pain Scale                   As your pain lessens:     Slowly start taking less pain medication. You may do this by waiting longer between doses or by taking smaller doses.  Stop using the pain medications as soon as you no longer need it, usually in 2-3 weeks.  Aspirin   To prevent blood clots, you will need to take Aspirin 325 mg twice a day for 30 days.  To prevent stomach upset or bleeding:   Do not take non-steroidal anti-inflammatory medications (Ibuprofen, Advil, Motrin, Naproxen, etc.)    Take Pepcid 20 mg twice a day, or a similar home medication, while you are taking a blood thinner.             OPSITE (Honeycomb dressing)     Keep your clear, waterproof dressing in place for 5-7 days after your leave the hospital.     If you are still having drainage, you will need to change your dressing in 5-7 days. You will be given one extra dressing to use at home.  If there is no more drainage from the wound, you may leave it open to the air. OPSITE DRESSING INSTRUCTIONS                   To increase and promote healing:   Stop Smoking (or at least cut back on       Smoking).  Eat a well-balanced diet (high in protein       and vitamin C).  If you have a poor appetite, drink Ensure, Glucerna, or         [place] Instant Breakfast for the next 30 days.  If you are diabetic, you should control your blood         sugars to prevent infection and help your wound         to heal.                         To prevent constipation, stay active and drink plenty of fluid.  While using pain medications, you should also take stool softeners and laxatives, such as Pericolace       and Miralax.  If you are having too many bowel       Movements, then you may need       to stop taking the laxatives.  You should have a bowel movement 3-4 days        after surgery and then at least every other day while       on pain medication.  To improve your recovery, you must be active!  Use your walker and take short walks (in your home)         about every 2 hours during the day.  Try to increase how far you walk each day.  You can put as much weight on your leg as you can tolerate while walking.  To avoid getting a stiff knee, work on getting your knee bent and straight as soon as possible.  Home health physical therapy will come to your        home a few times per week to teach you how to        get out of bed, to safely walk in your home, and        to do your exercises.  NO DRIVING until your surgeon tells you it is ok.      You can return to work when cleared by a physician.  Please call your physician immediately if you have:     Constant bleeding from your wound.  Increasing redness or swelling around your wound (Some warmth, bruising and swelling is normal).  Change in wound drainage (increase in amount, color, or bad smell).  Change in mental status (unusual behavior   Temperature over 101.5 degrees Fahrenheit      Pain or redness in the calf (back of your lower leg - see picture)     Increased swelling of the thigh, ankle, calf, or foot.  Emergency: CALL 911 if you have:     Shortness of breath     Chest pain when you cough or taking a deep breath     Please call your surgeons office at [phone]  for a follow up appointment. _   You should call as soon as you get home or the next day after discharge. Ask to make an appointment in 2 weeks.  If you have questions or concerns during normal business hours, you may reach Dr. Janice Roberts team at [phone].

## 2017-03-14 NOTE — PROGRESS NOTES
Bedside and Verbal shift change report given to Diane Goodpasture RN (oncoming nurse) by Dior Luna (offgoing nurse). Report included the following information SBAR, Kardex, Intake/Output, MAR and Recent Results.

## 2017-03-14 NOTE — PROGRESS NOTES
Spiritual Care Partner Volunteer visited patient in orthopedics on 3/14/2017. Documented by:  Visit by: Rev. Elmer Fairchild.  Sofia Samayoa MA, [facility]    Lead  Profession Development & Advancement

## 2017-03-14 NOTE — PROGRESS NOTES
Orthopedic End of Shift Note    Bedside shift change report given to Araceli Rao (oncoming nurse) by Pari Mccoy RN (offgoing nurse). Report included the following information SBAR, Kardex, Procedure Summary, Intake/Output, MAR and Recent Results.      POD#     Significant issues during shift: Low BP    Issues for Physician to address    Nausea/Vomiting [] yes [x] no     Osorio Catheter [] in [x] removed    Bowel Movements [] yes [x] no     Foot Pumps or SCD [x] yes [] no    Ice Pack [x] yes    [] no    Incentive Spirometer [x] yes [] no    Supplemental O2 [] yes [x] no Sat off O2:   99     Patient Vitals for the past 12 hrs:   Temp Pulse Resp BP SpO2   03/13/17 1920 97.6 °F (36.4 °C) 61 18 105/64 95 %   03/13/17 1734 98.2 °F (36.8 °C) 63 16 95/59 98 %   03/13/17 1447 97.8 °F (36.6 °C) (!) 58 16 95/56 98 %   03/13/17 1236 97.6 °F (36.4 °C) (!) 57 14 123/72 96 %   03/13/17 1200 97 °F (36.1 °C) 60 16 115/68 93 %   03/13/17 1156 - (!) 56 12 123/69 99 %   03/13/17 1145 - (!) 54 12 123/69 99 %   03/13/17 1130 - (!) 53 13 123/70 99 %   03/13/17 1115 - (!) 54 14 126/67 98 %   03/13/17 1100 - (!) 51 15 107/68 97 %   03/13/17 1045 97.7 °F (36.5 °C) (!) 53 16 110/62 98 %   03/13/17 1030 - (!) 53 14 110/60 99 %   03/13/17 1025 - (!) 53 15 114/58 99 %   03/13/17 1020 - (!) 54 16 101/59 98 %   03/13/17 1015 - (!) 58 15 108/58 98 %   03/13/17 1011 - (!) 56 21 105/63 98 %   03/13/17 1010 97.5 °F (36.4 °C) (!) 55 21 100/60 99 %   03/13/17 1008 - (!) 58 - - 97 %   03/13/17 1007 - - - 100/60 -     Lab Results   Component Value Date/Time    HGB 12.3 02/28/2017 09:33 AM    HCT 37.7 02/28/2017 09:33 AM     Lab Results   Component Value Date/Time    INR 1.1 02/28/2017 09:33 AM       Intake/Output Summary (Last 24 hours) at 03/13/17 2035  Last data filed at 03/13/17 1045   Gross per 24 hour   Intake             1800 ml   Output              420 ml   Net             1380 ml     Wound Knee Left (Active)   DRESSING STATUS Clean, dry, and intact 3/13/2017 11:47 AM   DRESSING TYPE 4 x 4;ABD pad;Cast padding;Elastic bandage;Gauze wrap (adelaida); Petroleum gauze; Staples 3/13/2017 10:06 AM   Incision site well approximated? Yes 3/13/2017  9:05 AM   Number of days:0            Peripheral Intravenous Line:  Peripheral IV 03/13/17 Right Arm (Active)   Site Assessment Clean, dry, & intact 3/13/2017 10:06 AM   Phlebitis Assessment 0 3/13/2017 10:06 AM   Infiltration Assessment 0 3/13/2017 10:06 AM   Dressing Status Clean, dry, & intact 3/13/2017 10:06 AM   Dressing Type Tape;Transparent 3/13/2017 10:06 AM   Hub Color/Line Status Pink; Infusing 3/13/2017 10:45 AM     Drain(s):  Hemovac Left Knee (Active)   Site Assessment Clean, dry, & intact 3/13/2017 10:06 AM   Dressing Status Clean, dry, & intact 3/13/2017 10:06 AM   Drainage Description Serosanguinous 3/13/2017 10:06 AM   Status Charged;Draining 3/13/2017 10:06 AM   Output (ml) 120 ml 3/13/2017 10:45 AM   .

## 2017-03-14 NOTE — PROGRESS NOTES
Problem: Mobility Impaired (Adult and Pediatric)  Goal: *Acute Goals and Plan of Care (Insert Text)  Physical Therapy Goals  Initiated 3/13/2017    1. Patient will move from supine to sit and sit to supine , scoot up and down and roll side to side in bed with modified independence within 4 days. 2. Patient will perform sit to stand with modified independence within 4 days. 3. Patient will ambulate with modified independence for 250 feet with the least restrictive device within 4 days. 4. Patient will ascend/descend 7 stairs with 1 handrail(s) with modified independence within 4 days. 5. Patient will perform home exercise program per protocol with independence within 4 days. 6. Patient will demonstrate AROM 0-90 degrees in operative joint within 4 days. PHYSICAL THERAPY TREATMENT  Patient: Dedrick Correia (21 y.o. male)  Date: 3/14/2017  Diagnosis: LEFT KNEE ARTHRITIS  Localized primary osteoarthritis of left lower leg  Primary localized osteoarthritis of left knee <principal problem not specified>  Procedure(s) (LRB):  LEFT TOTAL KNEE REPLACMENT (Left) 1 Day Post-Op  Precautions: WBAT      ASSESSMENT:  Pt Liane Salazar presents with decreased BP in supine rest that decreases further with passive repositioning with HOB elevated (also with palor and diaphoresis). RN aware and monitoring. He denies lightheadedness/dizziness and quickly falls asleep during pauses in conversation. Pt performs exercises as below with cues and encouragement. Mobility deferred due to unstable vitals. Progression toward goals:  [ ]      Improving appropriately and progressing toward goals  [X]      Improving slowly and progressing toward goals  [ ]      Not making progress toward goals and plan of care will be adjusted       PLAN:  Patient continues to benefit from skilled intervention to address the above impairments. Continue treatment per established plan of care. Discharge Recommendations:   To Be Determined: HHPT vs. Rehab  Further Equipment Recommendations for Discharge:  Rolling walker       SUBJECTIVE:   Patient stated Kye Flores we going to walk?  Pt educated regarding safety concerns and possible consequences of mobilizing with low BP.       OBJECTIVE DATA SUMMARY:   Critical Behavior:  Neurologic State: Alert, Appropriate for age  Orientation Level: Appropriate for age, Oriented X4  Cognition: Follows commands     Range of Motion:       L knee AROM 8-75                    Functional Mobility Training:  N/T        Therapeutic Exercises:     EXERCISE   Sets   Reps   Active Active Assist   Passive Self ROM   Comments   Ankle Pumps 1 10 [X]                                        [ ]                                        [ ]                                        [ ]                                            Quad Sets 3 5 [X]                                        [ ]                                        [ ]                                        [ ]                                            Hamstring Sets     [ ]                                        [ ]                                        [ ]                                        [ ]                                            Dania Shaker     [ ]                                        [ ]                                        [ ]                                        [ ]                                            Knee Extension Stretch 1     [ ]                                          [ ]                                          [X]                                          [ ]                                            Josh Causey 2 8 [X]                                        [ ]                                        [ ]                                        [ ]                                            Asia Aw     [ ]                                        [ ]                                        [ ]                                        [ ] Knee Flexion Stretch     [ ]                                        [ ]                                        [ ]                                        [ ]                                            Straight Leg Raises 1 5 [X]                                        [ ]                                        [ ]                                        [ ]                                               Pain:  Pain Scale 1: Numeric (0 - 10)  Pain Intensity 1: 3  Pain Location 1: Knee  Pain Orientation 1: Left  Pain Description 1: Aching  Pain Intervention(s) 1: Ice  Activity Tolerance:   Limited by decreased BP at rest  Please refer to the flowsheet for vital signs taken during this treatment.   After treatment:   [ ] Patient left in no apparent distress sitting up in chair  [X] Patient left in no apparent distress in bed as found  [X] Call bell left within reach  [X] Nursing notified  [X] Caregiver present  [ ] Bed alarm activated      COMMUNICATION/COLLABORATION:   The patients plan of care was discussed with: Registered Nurse and Rehabilitation Attendant     Elie Masters PT, DPT   Time Calculation: 14 mins

## 2017-03-14 NOTE — PROGRESS NOTES
Bedside and Verbal shift change report given to rachelle gutierrez (oncoming nurse) by Ivania Lockett (offgoing nurse). Report included the following information SBAR, Kardex, Procedure Summary, Intake/Output, MAR, Accordion and Recent Results.

## 2017-03-15 VITALS
TEMPERATURE: 98.1 F | BODY MASS INDEX: 27.47 KG/M2 | HEIGHT: 71 IN | DIASTOLIC BLOOD PRESSURE: 65 MMHG | WEIGHT: 196.21 LBS | HEART RATE: 73 BPM | RESPIRATION RATE: 18 BRPM | SYSTOLIC BLOOD PRESSURE: 127 MMHG | OXYGEN SATURATION: 98 %

## 2017-03-15 LAB
GLUCOSE BLD STRIP.AUTO-MCNC: 105 MG/DL (ref 65–100)
HGB BLD-MCNC: 9.2 G/DL (ref 12.1–17)
SERVICE CMNT-IMP: ABNORMAL

## 2017-03-15 PROCEDURE — 97530 THERAPEUTIC ACTIVITIES: CPT

## 2017-03-15 PROCEDURE — 74011250637 HC RX REV CODE- 250/637: Performed by: ORTHOPAEDIC SURGERY

## 2017-03-15 PROCEDURE — 36415 COLL VENOUS BLD VENIPUNCTURE: CPT | Performed by: ORTHOPAEDIC SURGERY

## 2017-03-15 PROCEDURE — 85018 HEMOGLOBIN: CPT | Performed by: ORTHOPAEDIC SURGERY

## 2017-03-15 PROCEDURE — 97116 GAIT TRAINING THERAPY: CPT

## 2017-03-15 PROCEDURE — 82962 GLUCOSE BLOOD TEST: CPT

## 2017-03-15 RX ORDER — AMOXICILLIN 250 MG
1 CAPSULE ORAL DAILY
Qty: 30 TAB | Refills: 0 | Status: SHIPPED | OUTPATIENT
Start: 2017-03-15

## 2017-03-15 RX ORDER — POLYETHYLENE GLYCOL 3350 17 G/17G
17 POWDER, FOR SOLUTION ORAL
Qty: 15 PACKET | Refills: 0 | Status: SHIPPED | OUTPATIENT
Start: 2017-03-15 | End: 2017-03-30

## 2017-03-15 RX ORDER — OXYCODONE HYDROCHLORIDE 5 MG/1
5-10 TABLET ORAL
Qty: 80 TAB | Refills: 0 | Status: SHIPPED | OUTPATIENT
Start: 2017-03-15 | End: 2019-09-15

## 2017-03-15 RX ORDER — ASPIRIN 325 MG
325 TABLET, DELAYED RELEASE (ENTERIC COATED) ORAL 2 TIMES DAILY
Qty: 60 TAB | Refills: 0 | Status: SHIPPED | OUTPATIENT
Start: 2017-03-15 | End: 2017-04-14

## 2017-03-15 RX ORDER — ACETAMINOPHEN 325 MG/1
650 TABLET ORAL EVERY 6 HOURS
Qty: 112 TAB | Refills: 0 | Status: SHIPPED | OUTPATIENT
Start: 2017-03-15 | End: 2017-03-29

## 2017-03-15 RX ORDER — FAMOTIDINE 20 MG/1
20 TABLET, FILM COATED ORAL 2 TIMES DAILY
Qty: 60 TAB | Refills: 0 | Status: SHIPPED | OUTPATIENT
Start: 2017-03-15 | End: 2017-04-14

## 2017-03-15 RX ADMIN — ACETAMINOPHEN 650 MG: 325 TABLET, FILM COATED ORAL at 06:40

## 2017-03-15 RX ADMIN — POLYETHYLENE GLYCOL 3350 17 G: 17 POWDER, FOR SOLUTION ORAL at 08:43

## 2017-03-15 RX ADMIN — ATORVASTATIN CALCIUM 10 MG: 10 TABLET, FILM COATED ORAL at 08:44

## 2017-03-15 RX ADMIN — DOCUSATE SODIUM AND SENNOSIDES 1 TABLET: 8.6; 5 TABLET, FILM COATED ORAL at 08:44

## 2017-03-15 RX ADMIN — ASPIRIN 325 MG: 325 TABLET, COATED ORAL at 08:43

## 2017-03-15 RX ADMIN — ACETAMINOPHEN 650 MG: 325 TABLET, FILM COATED ORAL at 13:22

## 2017-03-15 RX ADMIN — PAROXETINE 20 MG: 20 TABLET, FILM COATED ORAL at 08:44

## 2017-03-15 RX ADMIN — METFORMIN HYDROCHLORIDE 1000 MG: 500 TABLET, FILM COATED ORAL at 08:43

## 2017-03-15 RX ADMIN — Medication 10 ML: at 08:45

## 2017-03-15 RX ADMIN — DORZOLAMIDE HYDROCHLORIDE AND TIMOLOL MALEATE 1 DROP: 20; 5 SOLUTION/ DROPS OPHTHALMIC at 09:13

## 2017-03-15 RX ADMIN — FAMOTIDINE 20 MG: 20 TABLET ORAL at 08:44

## 2017-03-15 NOTE — DISCHARGE SUMMARY
Ortho Discharge Summary    Patient ID:  Mei Hall  [id number]  male  68 y.o.  1940    Admit date: 3/13/2017    Discharge date: 3/15/2017    Admitting Physician: Ju Donnelly MD     Consulting Physician(s):   Treatment Team: Attending Provider: Ju Donnelly MD    Date of Surgery:   3/13/2017     Preoperative Diagnosis:  LEFT KNEE ARTHRITIS    Postoperative Diagnosis:   LEFT KNEE ARTHRITIS    Procedure(s):     LEFT TOTAL KNEE REPLACMENT     Anesthesia Type:   Spinal     Surgeon: Ju Donnelly MD                            HPI:  Pt is a 68 y.o. male who has a history of LEFT KNEE ARTHRITIS  with pain and limitations of activities of daily living who presents at this time for a left TKA following the failure of conservative management. PMH:   Past Medical History:   Diagnosis Date    Arthritis     Diabetes ([address]     Hypercholesteremia     Hypertension     Ill-defined condition     clausterphobia       Medications upon admission :   Prior to Admission Medications   Prescriptions Last Dose Informant Patient Reported? Taking? EPINEPHrine (EPIPEN) 0.3 mg/0.3 mL injection   No No   Si.3 mL by IntraMUSCular route once as needed for up to 1 dose. PARoxetine (PAXIL) 20 mg tablet 3/13/2017 at 0430  Yes Yes   Sig: Take 20 mg by mouth daily. atorvastatin (LIPITOR) 10 mg tablet 3/12/2017 at 0800  Yes Yes   Sig: Take 10 mg by mouth daily. bimatoprost (LUMIGAN) 0.03 % ophthalmic drops 3/12/2017 at 2100  Yes Yes   Sig: Administer 1 Drop to left eye nightly. dorzolamide-timolol (COSOPT) 2-0.5 % ophthalmic solution 3/13/2017 at 0430  Yes Yes   Sig: Administer 1 Drop to left eye two (2) times a day. ibuprofen (MOTRIN) 200 mg tablet 3/5/2017  Yes No   Sig: Take 400 mg by mouth every six (6) hours as needed for Pain.   metFORMIN (GLUCOPHAGE) 1,000 mg tablet 3/12/2017 at 1800  Yes Yes   Sig: Take 1,000 mg by mouth two (2) times daily (with meals).      mupirocin calcium (BACTROBAN NASAL) 2 % nasal ointment 3/12/2017 at 2100  Yes Yes   Sig: by Both Nostrils route two (2) times a day. Pt to start 03/02/2017 BID X 5 days for Apparent Staph.   terazosin (HYTRIN) 10 mg capsule 3/12/2017 at 2100  Yes Yes   Sig: Take 10 mg by mouth nightly. valsartan-hydroCHLOROthiazide (DIOVAN-HCT) 320-12.5 mg per tablet 3/12/2017 at 0800  Yes Yes   Sig: Take 1 Tab by mouth daily. Indications: hypertension      Facility-Administered Medications: None        Allergies:  No Known Allergies     Hospital Course: The patient underwent surgery. Complications:  None; patient tolerated the procedure well. Was taken to the PACU in stable condition and then transferred to the ortho floor. Perioperative Antibiotics:  Ancef     Postoperative Pain Management:  Oxycodone      DVT Prophylaxis: Aspirin      Postoperative transfusions:    Number of units banked PRBCs =   none     Post Op complications: none    Hemoglobin at discharge:    Lab Results   Component Value Date/Time    HGB 9.2 (L) 03/15/2017 04:50 AM    INR 1.1 02/28/2017 09:33 AM       Dressing was changed on POD # 1. Incision - clean, dry and intact. No significant erythema or swelling. Neurovascular exam found to be within normal limits. Wound appears to be healing without any evidence of infection. Pt had a HVAC drain that was removed on POD# 1. Physical Therapy started on the day following surgery and participated in bed mobility, transfers and ambulation. Gait:  Gait  Base of Support: Widened  Speed/Shelly: Accelerated  Step Length: Left shortened  Stance: Left decreased  Gait Abnormalities: Decreased step clearance  Ambulation - Level of Assistance: Supervision  Distance (ft): 150 Feet (ft)  Assistive Device: Walker, rolling, Gait belt  Rail Use: Left   Stairs - Level of Assistance: Stand-by asssistance  Number of Stairs Trained: 7                   Discharged to: Home.     Condition on Discharge:   stable    Discharge instructions:  - Anticoagulate with Aspirin  - Take pain medications as prescribed  - Resume pre hospital diet      - Discharge activity: activity as tolerated  - Ambulate with Walker;    - Weight bearing status WBAT  - Wound Care Keep wound clean and dry. See discharge instruction sheet.  - Staples to be removed 10 days after surgery            -DISCHARGE MEDICATION LIST     Current Discharge Medication List      START taking these medications    Details   acetaminophen (TYLENOL) 325 mg tablet Take 2 Tabs by mouth every six (6) hours for 14 days. Qty: 112 Tab, Refills: 0      aspirin delayed-release 325 mg tablet Take 1 Tab by mouth two (2) times a day for 30 days. Qty: 60 Tab, Refills: 0      famotidine (PEPCID) 20 mg tablet Take 1 Tab by mouth two (2) times a day for 30 days. Qty: 60 Tab, Refills: 0      oxyCODONE IR (ROXICODONE) 5 mg immediate release tablet Take 1-2 Tabs by mouth every three (3) hours as needed. Max Daily Amount: 80 mg.  Qty: 80 Tab, Refills: 0      senna-docusate (PERICOLACE) 8.6-50 mg per tablet Take 1 Tab by mouth daily. Qty: 30 Tab, Refills: 0      polyethylene glycol (MIRALAX) 17 gram packet Take 1 Packet by mouth daily as needed (constipation) for up to 15 days. Qty: 15 Packet, Refills: 0         CONTINUE these medications which have NOT CHANGED    Details   mupirocin calcium (BACTROBAN NASAL) 2 % nasal ointment by Both Nostrils route two (2) times a day. Pt to start 03/02/2017 BID X 5 days for Apparent Staph.      valsartan-hydroCHLOROthiazide (DIOVAN-HCT) 320-12.5 mg per tablet Take 1 Tab by mouth daily. Indications: hypertension      terazosin (HYTRIN) 10 mg capsule Take 10 mg by mouth nightly. PARoxetine (PAXIL) 20 mg tablet Take 20 mg by mouth daily. atorvastatin (LIPITOR) 10 mg tablet Take 10 mg by mouth daily. metFORMIN (GLUCOPHAGE) 1,000 mg tablet Take 1,000 mg by mouth two (2) times daily (with meals).         dorzolamide-timolol (COSOPT) 2-0.5 % ophthalmic solution Administer 1 Drop to left eye two (2) times a day. bimatoprost (LUMIGAN) 0.03 % ophthalmic drops Administer 1 Drop to left eye nightly. EPINEPHrine (EPIPEN) 0.3 mg/0.3 mL injection 0.3 mL by IntraMUSCular route once as needed for up to 1 dose. Qty: 1 Syringe, Refills: 0         STOP taking these medications       ibuprofen (MOTRIN) 200 mg tablet Comments:   Reason for Stopping:            per medical continuation form      -Follow up in office in 2 weeks      Signed:  Rocio Vazquez.  Kalia Jiang, MSN, ACNP, ONP-C  Orthopaedic Nurse Practitioner    3/15/2017  2:39 PM

## 2017-03-15 NOTE — PROGRESS NOTES
Orthopaedic Progress Note  Post Op day: 2 Days Post-Op    March 15, 2017 10:07 AM   Stevenson Cook       [id number]  SUBJECTIVE:     Stevenson Cook is a 68 y.o. male s/p a left Procedure(s):  LEFT TOTAL KNEE REPLACMENT . Patients pain level is 3. Doing well. OBJECTIVE:       Physical Exam:  General: alert, cooperative, no distress. Gastrointestinal:  Soft, non-tender. Neurological:Neurovascular exam within normal limits. Musculoskeletal: Consuelo's sign negative in bilateral lower extremities. Dressing/Wound:  Clean, dry and intact. No significant erythema or swelling.     Vital Signs:       Patient Vitals for the past 8 hrs:   BP Temp Pulse Resp SpO2 Weight   03/15/17 0826 132/63 98.4 °F (36.9 °C) 72 18 97 % -   03/15/17 0421 - - - - - 89 kg (196 lb 3.4 oz)   03/15/17 0416 122/65 97.9 °F (36.6 °C) 74 18 96 % -                                          Temp (24hrs), Av °F (36.7 °C), Min:97.7 °F (36.5 °C), Max:98.4 °F (36.9 °C)    Labs:        Recent Labs      03/15/17   0450   HGB  9.2*     Lab Results   Component Value Date/Time    Sodium 141 2017 09:33 AM    Potassium 4.3 2017 09:33 AM    Chloride 104 2017 09:33 AM    CO2 28 2017 09:33 AM    Glucose 128 2017 09:33 AM    BUN 17 2017 09:33 AM    Creatinine 0.79 2017 09:33 AM    Calcium 8.3 2017 09:33 AM     PT/OT:        Gait  Base of Support: Widened  Speed/Shelly: Accelerated  Step Length: Left shortened  Gait Abnormalities: Antalgic, Decreased step clearance  Ambulation - Level of Assistance: Minimal assistance  Distance (ft): 15 Feet (ft)  Assistive Device: Walker, rolling, Gait belt       Patient mobility  Bed Mobility Training  Rolling: Supervision  Supine to Sit: Additional time, Supervision  Sit to Supine: Supervision  Scooting: Supervision  Transfer Training  Sit to Stand: Contact guard assistance  Stand to Sit: Contact guard assistance      Gait Training  Assistive Device: Víctor Schofield rolling, Gait belt  Ambulation - Level of Assistance: Minimal assistance  Distance (ft): 15 Feet (ft)          ASSESSMENT / PLAN:   Active Problems:    Localized primary osteoarthritis of left lower leg (3/13/2017)      Primary localized osteoarthritis of left knee (3/13/2017)                  Assessment           Plan   1. S/P left TKR   1. PT/OT/Rehab  2.  mg/d     3. [x]    Home w/ Family  []   SNF   4.   [x]     PT,OT,RN      []   SA[facility]/Rehab      Signed By: Hernán Cortes MD

## 2017-03-15 NOTE — PROGRESS NOTES
Bedside shift change report given to Georgiana (oncoming nurse) by Rubi Hubbard (offgoing nurse). Report included the following information SBAR, Kardex, Intake/Output, MAR, Accordion and Recent Results.

## 2017-03-15 NOTE — PROGRESS NOTES
Problem: Mobility Impaired (Adult and Pediatric)  Goal: *Acute Goals and Plan of Care (Insert Text)  Physical Therapy Goals  Initiated 3/13/2017    1. Patient will move from supine to sit and sit to supine , scoot up and down and roll side to side in bed with modified independence within 4 days. 2. Patient will perform sit to stand with modified independence within 4 days. 3. Patient will ambulate with modified independence for 250 feet with the least restrictive device within 4 days. 4. Patient will ascend/descend 7 stairs with 1 handrail(s) with modified independence within 4 days. 5. Patient will perform home exercise program per protocol with independence within 4 days. 6. Patient will demonstrate AROM 0-90 degrees in operative joint within 4 days. PHYSICAL THERAPY TREATMENT  Patient: Jose L Gomez (27 y.o. male)  Date: 3/15/2017  Diagnosis: LEFT KNEE ARTHRITIS  Localized primary osteoarthritis of left lower leg  Primary localized osteoarthritis of left knee <principal problem not specified>  Procedure(s) (LRB):  LEFT TOTAL KNEE REPLACMENT (Left) 2 Days Post-Op  Precautions: WBAT; Falls      ASSESSMENT:  Pt Segun Brownlee demonstrates significantly improved functional mobility and activity tolerance on POD 2 s/p L TKA. Pt with stable vitals today, alert and interactive. He ambulates on flat surfaces using rolling walker and negotiates stairs to simulate entry to home with SBA. Pt demonstrating appropriate transfer techniques; he receives education and cueing for decreased pace throughout all activities for safety and quality of performance. He demonstrates mobility sufficient for function within home environment and is cleared for discharge with spouse supervision. RN notified.   Progression toward goals:  [X]      Improving appropriately and progressing toward goals  [ ]      Improving slowly and progressing toward goals  [ ]      Not making progress toward goals and plan of care will be adjusted PLAN:  Patient continues to benefit from skilled intervention to address the above impairments. Continue treatment per established plan of care. Discharge Recommendations:  Home Health  Further Equipment Recommendations for Discharge:  Rolling walker       SUBJECTIVE:   Patient stated I want to get out of here!      Pt received seated in bedside chair with family present. OBJECTIVE DATA SUMMARY:   Critical Behavior:  Neurologic State: Alert, Appropriate for age  Orientation Level: Appropriate for age, Oriented X4  Cognition: Appropriate decision making     Range of Motion:                    LLE AROM  L Knee Flexion: 83  L Knee Extension: 10     Functional Mobility Training:  Bed Mobility:           Scooting: Modified independent        Transfers:  Sit to Stand: Supervision  Stand to Sit: Supervision; cues for walker placement 2/6 trials. Spouse demonstrates appropriate cueing to patient. Balance:  Sitting: Without support  Sitting - Static: Good (unsupported)  Sitting - Dynamic: Good (unsupported)  Standing: With support  Standing - Static: Good  Standing - Dynamic : Good  Ambulation/Gait Training:  Distance (ft): 150 Feet (ft)  Assistive Device: Walker, rolling;Gait belt  Ambulation - Level of Assistance: Supervision        Gait Abnormalities: Decreased step clearance           Stance: Left decreased  Speed/Shelly: Accelerated                                Cues for decreased pace for safety, BUEs on walker at all times. Car transfer with supervision and appropriate techniques.      Stairs:  Number of Stairs Trained: 7  Stairs - Level of Assistance: Stand-by asssistance  Rail Use: Left ; side-stepping  Therapeutic Exercises:     EXERCISE   Sets   Reps   Active Active Assist   Passive Self ROM   Comments   Ankle Pumps 1 10 [X]                                        [ ]                                        [ ]                                        [ ] Quad Sets 1 10 [X]                                        [ ]                                        [ ]                                        [ ]                                            Hamstring Sets     [ ]                                        [ ]                                        [ ]                                        [ ]                                            Allison Palmer     [ ]                                        [ ]                                        [ ]                                        [ ]                                            Knee Extension Stretch 1     [ ]                                          [ ]                                          [X]                                          [ ]                                            Heel Slides 1 10 [X]                                        [ ]                                        [ ]                                        [ ]                                            Kristen Valverde     [ ]                                        [ ]                                        [ ]                                        [ ]                                            Clerance Salines     [ ]                                        [ ]                                        [ ]                                        [ ]                                            Fallon Gerard     [ ]                                        [ ]                                        [ ]                                        [ ]                                               Pain:  Pain Scale 1: Numeric (0 - 10)  Pain Intensity 1: 2  Pain Location 1: Knee  Pain Orientation 1: Left  Pain Description 1: Aching  Pain Intervention(s) 1: Ice  Activity Tolerance:   VSS; no pt complaints  Please refer to the flowsheet for vital signs taken during this treatment.   After treatment:   [X] Patient left in no apparent distress sitting up in chair  [ ] Patient left in no apparent distress in bed  [X] Call bell left within reach  [X] Nursing notified  [X] Caregiver present  [ ] Bed alarm activated      COMMUNICATION/COLLABORATION:   The patients plan of care was discussed with: Registered Nurse and Rehabilitation Attendant     Janak Dorman, PT, DPT   Time Calculation: 36 mins

## 2017-03-15 NOTE — PROGRESS NOTES
Pt. D/[place] home with wife, discharge instructions and prescriptions [address]. and F/U informations

## 2019-09-15 ENCOUNTER — APPOINTMENT (OUTPATIENT)
Dept: GENERAL RADIOLOGY | Age: 79
End: 2019-09-15
Attending: EMERGENCY MEDICINE
Payer: MEDICARE

## 2019-09-15 ENCOUNTER — APPOINTMENT (OUTPATIENT)
Dept: CT IMAGING | Age: 79
End: 2019-09-15
Attending: EMERGENCY MEDICINE
Payer: MEDICARE

## 2019-09-15 ENCOUNTER — HOSPITAL ENCOUNTER (EMERGENCY)
Age: 79
Discharge: HOME | End: 2019-09-15
Attending: EMERGENCY MEDICINE
Payer: MEDICARE

## 2019-09-15 VITALS
SYSTOLIC BLOOD PRESSURE: 140 MMHG | HEIGHT: 70 IN | TEMPERATURE: 98.2 F | WEIGHT: 186 LBS | OXYGEN SATURATION: 98 % | HEART RATE: 55 BPM | BODY MASS INDEX: 26.63 KG/M2 | RESPIRATION RATE: 18 BRPM | DIASTOLIC BLOOD PRESSURE: 76 MMHG
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
## 2019-09-15 DIAGNOSIS — S22.41XA CLOSED FRACTURE OF MULTIPLE RIBS OF RIGHT SIDE, INITIAL ENCOUNTER: ICD-10-CM

## 2019-09-15 DIAGNOSIS — T14.8XXA PARASPINAL HEMATOMA: ICD-10-CM

## 2019-09-15 DIAGNOSIS — S32.009A CLOSED FRACTURE OF TRANSVERSE PROCESS OF LUMBAR VERTEBRA, INITIAL ENCOUNTER (HCC): Primary | ICD-10-CM

## 2019-09-15 LAB
ANION GAP SERPL CALC-SCNC: 5 MMOL/L (ref 5–15)
APPEARANCE UR: CLEAR
BACTERIA URNS QL MICRO: NEGATIVE /HPF
BASOPHILS # BLD: 0 K/UL (ref 0–0.1)
BASOPHILS NFR BLD: 0 % (ref 0–1)
BILIRUB UR QL: NEGATIVE
BUN SERPL-MCNC: 23 MG/DL (ref 6–20)
BUN/CREAT SERPL: 19 (ref 12–20)
CALCIUM SERPL-MCNC: 8.5 MG/DL (ref 8.5–10.1)
CHLORIDE SERPL-SCNC: 103 MMOL/L (ref 97–108)
CO2 SERPL-SCNC: 30 MMOL/L (ref 21–32)
COLOR UR: ABNORMAL
CREAT SERPL-MCNC: 1.21 MG/DL (ref 0.7–1.3)
DIFFERENTIAL METHOD BLD: ABNORMAL
EOSINOPHIL # BLD: 0.2 K/UL (ref 0–0.4)
EOSINOPHIL NFR BLD: 1 % (ref 0–7)
EPITH CASTS URNS QL MICRO: ABNORMAL /LPF
ERYTHROCYTE [DISTWIDTH] IN BLOOD BY AUTOMATED COUNT: 13.1 % (ref 11.5–14.5)
GLUCOSE SERPL-MCNC: 120 MG/DL (ref 65–100)
GLUCOSE UR STRIP.AUTO-MCNC: NEGATIVE MG/DL
HCT VFR BLD AUTO: 36.9 % (ref 36.6–50.3)
HGB BLD-MCNC: 12.2 G/DL (ref 12.1–17)
HGB UR QL STRIP: NEGATIVE
IMM GRANULOCYTES # BLD AUTO: 0.1 K/UL (ref 0–0.04)
IMM GRANULOCYTES NFR BLD AUTO: 1 % (ref 0–0.5)
KETONES UR QL STRIP.AUTO: NEGATIVE MG/DL
LEUKOCYTE ESTERASE UR QL STRIP.AUTO: NEGATIVE
LYMPHOCYTES # BLD: 0.8 K/UL (ref 0.8–3.5)
LYMPHOCYTES NFR BLD: 7 % (ref 12–49)
MCH RBC QN AUTO: 31.2 PG (ref 26–34)
MCHC RBC AUTO-ENTMCNC: 33.1 G/DL (ref 30–36.5)
MCV RBC AUTO: 94.4 FL (ref 80–99)
MONOCYTES # BLD: 0.9 K/UL (ref 0–1)
MONOCYTES NFR BLD: 7 % (ref 5–13)
NEUTS SEG # BLD: 10.5 K/UL (ref 1.8–8)
NEUTS SEG NFR BLD: 84 % (ref 32–75)
NITRITE UR QL STRIP.AUTO: NEGATIVE
NRBC # BLD: 0 K/UL (ref 0–0.01)
NRBC BLD-RTO: 0 PER 100 WBC
PH UR STRIP: 7 [PH] (ref 5–8)
PLATELET # BLD AUTO: 210 K/UL (ref 150–400)
PMV BLD AUTO: 10.6 FL (ref 8.9–12.9)
POTASSIUM SERPL-SCNC: 4.1 MMOL/L (ref 3.5–5.1)
PROT UR STRIP-MCNC: 30 MG/DL
RBC # BLD AUTO: 3.91 M/UL (ref 4.1–5.7)
RBC #/AREA URNS HPF: ABNORMAL /HPF (ref 0–5)
SODIUM SERPL-SCNC: 138 MMOL/L (ref 136–145)
SP GR UR REFRACTOMETRY: 1.02 (ref 1–1.03)
UROBILINOGEN UR QL STRIP.AUTO: 1 EU/DL (ref 0.2–1)
WBC # BLD AUTO: 12.5 K/UL (ref 4.1–11.1)
WBC URNS QL MICRO: ABNORMAL /HPF (ref 0–4)

## 2019-09-15 PROCEDURE — 81001 URINALYSIS AUTO W/SCOPE: CPT

## 2019-09-15 PROCEDURE — 71045 X-RAY EXAM CHEST 1 VIEW: CPT

## 2019-09-15 PROCEDURE — 99285 EMERGENCY DEPT VISIT HI MDM: CPT

## 2019-09-15 PROCEDURE — 36415 COLL VENOUS BLD VENIPUNCTURE: CPT

## 2019-09-15 PROCEDURE — 74177 CT ABD & PELVIS W/CONTRAST: CPT

## 2019-09-15 PROCEDURE — 70450 CT HEAD/BRAIN W/O DYE: CPT

## 2019-09-15 PROCEDURE — 85025 COMPLETE CBC W/AUTO DIFF WBC: CPT

## 2019-09-15 PROCEDURE — 80048 BASIC METABOLIC PNL TOTAL CA: CPT

## 2019-09-15 PROCEDURE — 74011636320 HC RX REV CODE- 636/320: Performed by: EMERGENCY MEDICINE

## 2019-09-15 PROCEDURE — 74011250637 HC RX REV CODE- 250/637: Performed by: EMERGENCY MEDICINE

## 2019-09-15 RX ORDER — OXYCODONE HYDROCHLORIDE 5 MG/1
5 TABLET ORAL
Qty: 15 TAB | Refills: 0 | Status: SHIPPED | OUTPATIENT
Start: 2019-09-15 | End: 2019-09-20

## 2019-09-15 RX ORDER — OXYCODONE HYDROCHLORIDE 5 MG/1
5 TABLET ORAL
Status: COMPLETED | OUTPATIENT
Start: 2019-09-15 | End: 2019-09-15

## 2019-09-15 RX ORDER — SODIUM CHLORIDE 0.9 % (FLUSH) 0.9 %
10 SYRINGE (ML) INJECTION
Status: COMPLETED | OUTPATIENT
Start: 2019-09-15 | End: 2019-09-15

## 2019-09-15 RX ADMIN — OXYCODONE HYDROCHLORIDE 5 MG: 5 TABLET ORAL at 17:41

## 2019-09-15 RX ADMIN — IOPAMIDOL 100 ML: 755 INJECTION, SOLUTION INTRAVENOUS at 19:12

## 2019-09-15 RX ADMIN — Medication 10 ML: at 19:12

## 2019-09-15 NOTE — ED PROVIDER NOTES
EMERGENCY DEPARTMENT HISTORY AND PHYSICAL EXAM      Date: 9/15/2019  Patient Name: Lj Marshall    History of Presenting Illness     Chief Complaint   Patient presents with    Fall     c/o back pain and right leg pain secondary to slipping and falling one hour ago; denies head injury/LOC       History Provided By: Patient    HPI: Lj Marshall, 78 y.o. male with PMHx as noted below presents the emergency department for evaluation of back pain after a fall. Patient states he slipped in the garage earlier this evening falling injuring his right side. Since then he notes pain from his right lower back to his right buttock there is been constant and worse with movement. Patient states he was unable to get up unassisted so was brought here for evaluation. He otherwise has denied any lower extremity weakness, bowel bladder dysfunction, saddle anesthesia. He denies any shortness of breath or chest pain. PCP: Gauri Abreu MD    Current Outpatient Medications   Medication Sig Dispense Refill    oxyCODONE IR (ROXICODONE) 5 mg immediate release tablet Take 1 Tab by mouth every six (6) hours as needed for Pain for up to 5 days. Max Daily Amount: 20 mg. 15 Tab 0    senna-docusate (PERICOLACE) 8.6-50 mg per tablet Take 1 Tab by mouth daily. 30 Tab 0    mupirocin calcium (BACTROBAN NASAL) 2 % nasal ointment by Both Nostrils route two (2) times a day. Pt to start 03/02/2017 BID X 5 days for Apparent Staph.  valsartan-hydroCHLOROthiazide (DIOVAN-HCT) 320-12.5 mg per tablet Take 1 Tab by mouth daily. Indications: hypertension      terazosin (HYTRIN) 10 mg capsule Take 10 mg by mouth nightly.  PARoxetine (PAXIL) 20 mg tablet Take 20 mg by mouth daily.  EPINEPHrine (EPIPEN) 0.3 mg/0.3 mL injection 0.3 mL by IntraMUSCular route once as needed for up to 1 dose. 1 Syringe 0    atorvastatin (LIPITOR) 10 mg tablet Take 10 mg by mouth daily.         metFORMIN (GLUCOPHAGE) 1,000 mg tablet Take 1,000 mg by mouth two (2) times daily (with meals).  dorzolamide-timolol (COSOPT) 2-0.5 % ophthalmic solution Administer 1 Drop to left eye two (2) times a day.  bimatoprost (LUMIGAN) 0.03 % ophthalmic drops Administer 1 Drop to left eye nightly. Past History     Past Medical History:  Past Medical History:   Diagnosis Date    Arthritis     Diabetes ([address]     Hypercholesteremia     Hypertension     Ill-defined condition     clausterphobia       Past Surgical History:  Past Surgical History:   Procedure Laterality Date    HX CYST REMOVAL      HX KNEE ARTHROSCOPY Left     HX ORTHOPAEDIC      carpal tunnel surgery       Family History:  Family History   Problem Relation Age of Onset    Cancer Mother     Cancer Father     Cancer Sister         brain tumor       Social History:  Social History     Tobacco Use    Smoking status: Former Smoker     Packs/day: 1.00     Years: 1.00     Pack years: 1.00    Smokeless tobacco: Never Used   Substance Use Topics    Alcohol use: No    Drug use: No       Allergies:  No Known Allergies      Review of Systems   Review of Systems  Constitutional: Negative for fever, chills, and fatigue. HENT: Negative for congestion, sore throat, rhinorrhea, sneezing and neck stiffness   Eyes: Negative for discharge and redness. Respiratory: Negative for  shortness of breath, wheezing   Cardiovascular: Negative for chest pain, palpitations   Gastrointestinal: Negative for nausea, vomiting, abdominal pain, constipation, diarrhea and blood in stool. Genitourinary: Negative for dysuria, hematuria, flank pain, decreased urine volume, discharge,   Musculoskeletal: Negative for myalgias or joint pain . Positive back pain  Skin: Negative for rash or lesions . Neurological: Negative weakness, light-headedness, numbness and headaches.        Physical Exam   Physical Exam    GENERAL: alert and oriented, no acute distress  EYES: PEERL, No injection, discharge or icterus. ENT: Mucous membranes pink and moist.  NECK: Supple, no midline TTP, full ROM  LUNGS: Airway patent. Non-labored respirations. Breath sounds clear with good air entry bilaterally. HEART: Regular rate and rhythm. No peripheral edema  ABDOMEN: Non-distended and non-tender, without guarding or rebound. SKIN:  warm, dry  MSK/EXTREMITIES: Patient has tenderness over the lumbar spine and right paralumbar musculature. No obvious hematomas. NEUROLOGICAL: Alert, oriented, strength 5/5 bilateral lower extremities, sensation intact and equal throughout to light touch, steady gait. Diagnostic Study Results     Labs -     reviewed    Radiologic Studies -   XR CHEST PORT   Final Result   IMPRESSION:   No acute intrathoracic process. CT HEAD WO CONT   Final Result   IMPRESSION:       Moderate presumed small vessel ischemic disease   No acute intracranial hemorrhage         CT ABD PELV W CONT   Final Result   IMPRESSION:   There is no acute intraperitoneal process. Minimally displaced posterior right rib fractures, T11 and T12. Transverse   process fractures L1-L5 with soft tissue hematoma in the paraspinal musculature   on the right as well. Left renal hypodensity is not characterized. Renal mass protocol CT to rule out   possible underlying neoplasm is recommended. Diverticula without evidence of diverticulitis. Cholelithiasis. Extensive degenerative change of the lumbar spine with multilevel canal and   foraminal stenosis severe at L4-5. CT Results  (Last 48 hours)               09/15/19 1911  CT HEAD WO CONT Final result    Impression:  IMPRESSION:        Moderate presumed small vessel ischemic disease   No acute intracranial hemorrhage           Narrative:  EXAM: CT HEAD WO CONT       INDICATION: Back pain and right leg pain. Patient fell one hour prior to   admission. COMPARISON: 11.25.2012. CONTRAST: None.        TECHNIQUE: Unenhanced CT of the head was performed using 5 mm images. Brain and   bone windows were generated. CT dose reduction was achieved through use of a   standardized protocol tailored for this examination and automatic exposure   control for dose modulation. 2 acquisitions were necessary because of patient   motion. FINDINGS:   The ventricles and sulci are normal in size, shape and configuration and   midline. There is moderate periventricular white matter hypodensity. . There is   no intracranial hemorrhage, extra-axial collection, mass, mass effect or midline   shift. The basilar cisterns are open. No acute infarct is identified. The bone   windows demonstrate no abnormalities. The visualized portions of the paranasal   sinuses and mastoid air cells are clear. 09/15/19 1911  CT ABD PELV W CONT Final result    Impression:  IMPRESSION:   There is no acute intraperitoneal process. Minimally displaced posterior right rib fractures, T11 and T12. Transverse   process fractures L1-L5 with soft tissue hematoma in the paraspinal musculature   on the right as well. Left renal hypodensity is not characterized. Renal mass protocol CT to rule out   possible underlying neoplasm is recommended. Diverticula without evidence of diverticulitis. Cholelithiasis. Extensive degenerative change of the lumbar spine with multilevel canal and   foraminal stenosis severe at L4-5. Narrative:  EXAM: CT ABD PELV W CONT   Clinical history: Fall, right flank pain   INDICATION: fall, rt flank, l spine pain       COMPARISON: None        CONTRAST: 100 mL of Isovue-370. TECHNIQUE:    Following the uneventful intravenous administration of contrast, thin axial   images were obtained through the abdomen and pelvis. Coronal and sagittal   reconstructions were generated. Oral contrast was not administered.  CT dose   reduction was achieved through use of a standardized protocol tailored for this   examination and automatic exposure control for dose modulation. FINDINGS:    LUNG BASES: Clear. INCIDENTALLY IMAGED HEART AND MEDIASTINUM: Unremarkable. LIVER: No mass or biliary dilatation. GALLBLADDER: No lithiasis   SPLEEN: No mass. PANCREAS: No mass or ductal dilatation. ADRENALS: Unremarkable. 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 Left lower pole renal hyperdensity is not characterized. STOMACH: Unremarkable. SMALL BOWEL: No dilatation or wall thickening. COLON: Colonic diverticula evidence of diverticulitis. APPENDIX: Unremarkable. PERITONEUM: No ascites or pneumoperitoneum. RETROPERITONEUM: No lymphadenopathy or aortic aneurysm. REPRODUCTIVE ORGANS: Enlarged prostate gland. URINARY BLADDER: No mass or calculus. BONES: Degenerative change of the lumbar spine. Multilevel disc   protrusions/osteophyte. Severe canal stenosis at L4-5. Multilevel severe   foraminal stenosis. Spondylolisthesis at L4 on L5. Severe canal stenosis in the   lower thoracic spine as well. Severe foraminal and canal stenosis at L1-L2. T12-L1 severe foraminal stenoses. Transverse process fractures on the right at   L5 L4 L3 L2 and L1 minimally displaced. There is a soft tissue hematoma in the   paraspinal musculature. Minimally displaced posterior rib fractures on the right   at T11 and T12. ADDITIONAL COMMENTS: N/A               CXR Results  (Last 48 hours)               09/15/19 2222  XR CHEST PORT Final result    Impression:  IMPRESSION:   No acute intrathoracic process. Narrative:  PORTABLE CHEST RADIOGRAPH/S: 9/15/2019 10:27 PM       Clinical history: Chest pain, trauma, posterior rib fractures on the right. INDICATION:   Chest Pain   COMPARISON: None       FINDINGS:   AP portable upright view of the chest demonstrates a stable  cardiopericardial   silhouette. The lungs are adequately expanded. There is no edema, effusion,   consolidation, or pneumothorax.  Right-sided rib fractures are better delineated   on the CT of the abdomen and pelvis. . Patient is on a cardiac monitor. Medical Decision Making   I am the first provider for this patient. I reviewed the vital signs, available nursing notes, past medical history, past surgical history, family history and social history. Vital Signs-Reviewed the patient's vital signs. Records Reviewed: Nursing Notes and Old Medical Records    Provider Notes (Medical Decision Making): On presentation, the patient is well appearing, in no acute distress with normal vital signs. Based on my history and exam the differential diagnosis for this patient includes head injury, rib fracture, lumbar spine fracture. Patient with no neuro deficits on exam or bowel/bladder dysfunction so low suspicion for spinal cord injury. Imaging was notable for multiple transverse process fractures, 2 rib fractures and a paraspinal soft tissue hematoma. Patient's pain was easily controlled with medications and he was able to ambulate with a steady gait. I did consult with Dr. Dariela Vickers with neurosurgery and noted that there was nothing to do from a neurosurgical perspective with no immobilization/stabilization necessary. I did have a lengthy discussion with the patient and patient's family as given his age with multiple injuries he is at very high risk for complications and had discussed transferring to a trauma center for observation and pain management as well as serial hemoglobins given his hematoma. Both patient and patient's family would like to be discharged and will monitor him closely at home. They all fully understand that his condition could rapidly decline and that he could develop pneumonia. I have instructed him on incentive spirometry use and pain management at home.   I have also asked him to please return to the emergency department if his symptoms are to worsen at any time and to call his primary care physician first thing in the morning for an appointment. They also expressed understanding and in agreement with this plan. While I have recommended admission/transfer I do feel that the family is reliable so we will discharge at this time. ED Course:   Initial assessment performed. The patients presenting problems have been discussed, and they are in agreement with the care plan formulated and outlined with them. I have encouraged them to ask questions as they arise throughout their visit. PROGRESS NOTE:  The patient has been re-evaluated and is ready for discharge. Reviewed available results with patient and have counseled them on diagnosis and care plan. They have expressed understanding, and all their questions have been answered. They agree with plan and agree to have pt F/U as recommended, or return to the ED if their sxs worsen. Discharge instructions have been provided and explained to them, along with reasons to have pt return to the ED. The patient is amenable to discharge so will discharge pt at this time    Kayce Kang MD        Disposition:  home    PLAN:  1. Discharge Medication List as of 9/15/2019 11:04 PM      CONTINUE these medications which have CHANGED    Details   oxyCODONE IR (ROXICODONE) 5 mg immediate release tablet Take 1 Tab by mouth every six (6) hours as needed for Pain for up to 5 days. Max Daily Amount: 20 mg., Print, Disp-15 Tab, R-0         CONTINUE these medications which have NOT CHANGED    Details   senna-docusate (PERICOLACE) 8.6-50 mg per tablet Take 1 Tab by mouth daily. , Print, Disp-30 Tab, R-0      mupirocin calcium (BACTROBAN NASAL) 2 % nasal ointment by Both Nostrils route two (2) times a day. Pt to start 03/02/2017 BID X 5 days for Apparent Staph., Historical Med      valsartan-hydroCHLOROthiazide (DIOVAN-HCT) 320-12.5 mg per tablet Take 1 Tab by mouth daily.  Indications: hypertension, Historical Med      terazosin (HYTRIN) 10 mg capsule Take 10 mg by mouth nightly., Historical Med      PARoxetine (PAXIL) 20 mg tablet Take 20 mg by mouth daily. , Historical Med      EPINEPHrine (EPIPEN) 0.3 mg/0.3 mL injection 0.3 mL by IntraMUSCular route once as needed for up to 1 dose., Print, Disp-1 Syringe, R-0      atorvastatin (LIPITOR) 10 mg tablet Take 10 mg by mouth daily. Historical Med, 10 mg      metFORMIN (GLUCOPHAGE) 1,000 mg tablet Take 1,000 mg by mouth two (2) times daily (with meals). Historical Med, 1,000 mg      dorzolamide-timolol (COSOPT) 2-0.5 % ophthalmic solution Administer 1 Drop to left eye two (2) times a day. Historical Med, 1 Drop      bimatoprost (LUMIGAN) 0.03 % ophthalmic drops Administer 1 Drop to left eye nightly., Historical Med           2. Follow-up Information     Follow up With Specialties Details Why Contact Info    Jean Marie Harper MD [facility] Schedule an appointment as soon as possible for a visit in 1 day  [address]  [phone] [address]  [phone]      [facility] EMERGENCY DEPT Emergency Medicine  If symptoms worsen [address]  [address]  [phone]        Return to ED if worse     Diagnosis     Clinical Impression:   1. Closed fracture of transverse process of lumbar vertebra, initial encounter ([facility] [address]    2. Closed fracture of multiple ribs of right side, initial encounter    3. Paraspinal hematoma    4.  Fall, initial encounter

## 2019-09-16 ENCOUNTER — HOME HEALTH ADMISSION (OUTPATIENT)
Dept: HOME HEALTH SERVICES | Facility: HOME HEALTH | Age: 79
End: 2019-09-16
Payer: MEDICARE

## 2019-09-16 NOTE — ED NOTES
Instructions given for Incentive Spirometry, pt. Verbalized understanding. Pt. Discharged to home by MD alert and oriented x 3 no distress.  With family, iv d/c'd, via wc

## 2019-09-16 NOTE — PROGRESS NOTES
0915:  Weekend message left in ED book for CM to set up home PT for patient, in ED yesterday for falls, rib fx. Call placed to patient's home, privacy appropriate message left for patient to please call CM back.     1400:  Patient accepted by [facility] for home health PT      Kevin Schmidt RN, [address] [place] [address]  [phone]

## 2019-09-16 NOTE — DISCHARGE INSTRUCTIONS
Patient Education        Preventing Falls: Care Instructions  Your Care Instructions    Getting around your home safely can be a challenge if you have injuries or health problems that make it easy for you to fall. Loose rugs and furniture in walkways are among the dangers for many older people who have problems walking or who have poor eyesight. People who have conditions such as arthritis, osteoporosis, or dementia also have to be careful not to fall. You can make your home safer with a few simple measures. Follow-up care is a key part of your treatment and safety. Be sure to make and go to all appointments, and call your doctor if you are having problems. It's also a good idea to know your test results and keep a list of the medicines you take. How can you care for yourself at home? Taking care of yourself  · You may get dizzy if you do not drink enough water. To prevent dehydration, drink plenty of fluids, enough so that your urine is light yellow or clear like water. Choose water and other caffeine-free clear liquids. If you have kidney, heart, or liver disease and have to limit fluids, talk with your doctor before you increase the amount of fluids you drink. · Exercise regularly to improve your strength, muscle tone, and balance. Walk if you can. Swimming may be a good choice if you cannot walk easily. · Have your vision and hearing checked each year or any time you notice a change. If you have trouble seeing and hearing, you might not be able to avoid objects and could lose your balance. · Know the side effects of the medicines you take. Ask your doctor or pharmacist whether the medicines you take can affect your balance. Sleeping pills or sedatives can affect your balance. · Limit the amount of alcohol you drink. Alcohol can impair your balance and other senses. · Ask your doctor whether calluses or corns on your feet need to be removed.  If you wear loose-fitting shoes because of calluses or corns, you can lose your balance and fall. · Talk to your doctor if you have numbness in your feet. Preventing falls at home  · Remove raised doorway thresholds, throw rugs, and clutter. Repair loose carpet or raised areas in the floor. · Move furniture and electrical cords to keep them out of walking paths. · Use nonskid floor wax, and wipe up spills right away, especially on ceramic tile floors. · If you use a walker or cane, put rubber tips on it. If you use crutches, clean the bottoms of them regularly with an abrasive pad, such as steel wool. · Keep your house well lit, especially [employer], and outside walkways. Use night-lights in areas such as hallways and bathrooms. Add extra light switches or use remote switches (such as switches that go on or off when you clap your hands) to make it easier to turn lights on if you have to get up during the night. · Install sturdy handrails on stairways. · Move items in your cabinets so that the things you use a lot are on the lower shelves (about waist level). · Keep a cordless phone and a flashlight with new batteries by your bed. If possible, put a phone in each of the main rooms of your house, or carry a cell phone in case you fall and cannot reach a phone. Or, you can wear a device around your neck or wrist. You push a button that sends a signal for help. · Wear low-heeled shoes that fit well and give your feet good support. Use footwear with nonskid soles. Check the heels and soles of your shoes for wear. Repair or replace worn heels or soles. · Do not wear socks without shoes on wood floors. · Walk on the grass when the sidewalks are slippery. If you live in an area that gets snow and ice in the winter, sprinkle salt on slippery steps and sidewalks. Preventing falls in the bath  · Install grab bars and nonskid mats inside and outside your shower or tub and near the toilet and sinks. · Use shower chairs and bath benches.   · Use a hand-held shower head that will allow you to sit while showering. · Get into a tub or shower by putting the weaker leg in first. Get out of a tub or shower with your strong side first.  · Repair loose toilet seats and consider installing a raised toilet seat to make getting on and off the toilet easier. · Keep your bathroom door unlocked while you are in the shower. Where can you learn more? Go to http://sonny-stefanie.info/. Enter [id number] [id number] in the search box to learn more about \"Preventing Falls: Care Instructions. \"  Current as of: November 7, 2018  Content Version: 12.1  © 1849-6915 [employer]. Care instructions adapted under license by [employer] (which disclaims liability or warranty for this information). If you have questions about a medical condition or this instruction, always ask your healthcare professional. [place] [address] any warranty or liability for your use of this information. Patient Education        Broken Rib: Care Instructions  Your Care Instructions    A broken rib is a crack or break in one of the bones of the rib cage. Breathing can be very painful because the muscles used for breathing pull on the rib. In most cases, a broken rib will heal on its own. You can take pain medicine while the rib mends. Pain relief allows you to take deep breaths. In the past, doctors recommended taping or wrapping broken ribs. This is no longer done because taping makes it hard for you to take deep breaths. Taking deep breaths may help prevent pneumonia or a partial collapse of a lung. Your rib will heal in about 6 weeks. You heal best when you take good care of yourself. Eat a variety of healthy foods, and don't smoke. Follow-up care is a key part of your treatment and safety. Be sure to make and go to all appointments, and call your doctor if you are having problems.  It's also a good idea to know your test results and keep a list of the medicines you take.  How can you care for yourself at home? · Be safe with medicines. Read and follow all instructions on the label. ? If the doctor gave you a prescription medicine for pain, take it as prescribed. ? If you are not taking a prescription pain medicine, ask your doctor if you can take an over-the-counter medicine. · Even if it hurts, try to cough or take the deepest breath you can at least once every hour. This will get air deeply into your lungs. This may reduce your chance of getting pneumonia or a partial collapse of a lung. Hold a pillow against your chest to make this less painful. · Put ice or a cold pack on the area for 10 to 20 minutes at a time. Put a thin cloth between the ice and your skin. When should you call for help? Call 911 anytime you think you may need emergency care. For example, call if:    · You have severe trouble breathing.    Call your doctor now or seek immediate medical care if:    · You have some trouble breathing.     · You have a fever.     · You have a new or worse cough.    Watch closely for changes in your health, and be sure to contact your doctor if:    · You have pain even after taking your medicine.     · You do not get better as expected. Where can you learn more? Go to http://sonny-stefanie.info/. Enter M135 in the search box to learn more about \"Broken Rib: Care Instructions. \"  Current as of: September 20, 2018  Content Version: 12.1  © 6727-4023 [employer]. Care instructions adapted under license by [employer] (which disclaims liability or warranty for this information). If you have questions about a medical condition or this instruction, always ask your healthcare professional. [place] [address] any warranty or liability for your use of this information.          Patient Education        Spine Fracture: Care Instructions  Your Care Instructions    A spine fracture is a break in one of the bones (vertebrae) in your spine. The body of the vertebra, which bears the weight, can break. It has smaller bones that branch off to form a protective ring around the spinal cord. These bones, which can also break, include:  · The spinous process, which sticks out behind the vertebra. · The transverse processes, which stick out from the sides. · The pedicles, which connect the processes to the vertebral body. · The lamina, which connects the processes to each other. A spine fracture can damage the spinal cord or a nerve root. The nerve root is the part of a nerve that leads from the spinal cord to the rest of the body. Your doctor will give you specific information about your type of break. You may be sent home with a brace to help your back heal. You can help your spine heal with care at home. You heal best when you take good care of yourself. Eat a variety of healthy foods, and don't smoke. Follow-up care is a key part of your treatment and safety. Be sure to make and go to all appointments, and call your doctor if you are having problems. It's also a good idea to know your test results and keep a list of the medicines you take. How can you care for yourself at home? · Follow your doctor's instructions for bed rest and activity. If you have a brace, wear it as directed. Do not stop wearing it until your doctor tells you to stop. · Do any exercises that you are given to keep your muscles strong and reduce stiffness. · Be safe with medicines. Take pain medicines exactly as directed. ? If the doctor gave you a prescription medicine for pain, take it as prescribed. ? If you are not taking a prescription pain medicine, ask your doctor if you can take an over-the-counter medicine. · Put ice or a cold pack on the painful area for 10 to 20 minutes at a time. Try to do this every 1 to 2 hours for the next 3 days (when you are awake). Put a thin cloth between the ice and your skin or brace.   · Make sure that paths in your home are clear so that you do not fall. Also, make sure that lighting is good and that carpets are tacked down to prevent tripping. · Do not drive unless your doctor says that it is okay. If you are allowed to drive, always wear a seat belt. · Talk to your doctor about medicines or changes in your diet that can help make your bones stronger. When should you call for help? Call 911 anytime you think you may need emergency care. For example, call if:    · You cannot move an arm or a leg at all.   [facility] your doctor now or seek immediate medical care if:    · You have new or worse symptoms in your arms, legs, chest, belly, or buttocks. Symptoms may include:  ? Numbness or tingling. ? Weakness. ? Pain.     · You lose bladder or bowel control.     · You have belly pain, bloating, vomiting, or nausea.    Watch closely for changes in your health, and be sure to contact your doctor if:    · You have a fever, lose weight, or don't feel well.     · You are not getting better as expected. Where can you learn more? Go to http://sonny-stefanie.info/. Enter W235 in the search box to learn more about \"Spine Fracture: Care Instructions. \"  Current as of: September 20, 2018  Content Version: 12.1  © 6361-1194 [employer]. Care instructions adapted under license by [employer] (which disclaims liability or warranty for this information). If you have questions about a medical condition or this instruction, always ask your healthcare professional. [place] [address] any warranty or liability for your use of this information.

## 2019-09-16 NOTE — ED NOTES
Pt. Alert and oriented x 3, no distress. Pt. Ambulated several feet without assist but with increasing pain. MD made aware.

## 2019-09-17 ENCOUNTER — HOME CARE VISIT (OUTPATIENT)
Dept: SCHEDULING | Facility: HOME HEALTH | Age: 79
End: 2019-09-17
Payer: MEDICARE

## 2019-09-17 VITALS
SYSTOLIC BLOOD PRESSURE: 112 MMHG | DIASTOLIC BLOOD PRESSURE: 60 MMHG | RESPIRATION RATE: 18 BRPM | OXYGEN SATURATION: 97 % | TEMPERATURE: 97.6 F | HEART RATE: 62 BPM

## 2019-09-17 PROCEDURE — 400013 HH SOC

## 2019-09-17 PROCEDURE — 3331090001 HH PPS REVENUE CREDIT

## 2019-09-17 PROCEDURE — 3331090002 HH PPS REVENUE DEBIT

## 2019-09-17 PROCEDURE — G0151 HHCP-SERV OF PT,EA 15 MIN: HCPCS

## 2019-09-18 PROCEDURE — 3331090002 HH PPS REVENUE DEBIT

## 2019-09-18 PROCEDURE — 3331090001 HH PPS REVENUE CREDIT

## 2019-09-19 ENCOUNTER — HOME CARE VISIT (OUTPATIENT)
Dept: SCHEDULING | Facility: HOME HEALTH | Age: 79
End: 2019-09-19
Payer: MEDICARE

## 2019-09-19 VITALS
SYSTOLIC BLOOD PRESSURE: 126 MMHG | HEART RATE: 74 BPM | RESPIRATION RATE: 17 BRPM | DIASTOLIC BLOOD PRESSURE: 80 MMHG | OXYGEN SATURATION: 96 % | TEMPERATURE: 98.4 F

## 2019-09-19 PROCEDURE — 3331090001 HH PPS REVENUE CREDIT

## 2019-09-19 PROCEDURE — G0157 HHC PT ASSISTANT EA 15: HCPCS

## 2019-09-19 PROCEDURE — 3331090002 HH PPS REVENUE DEBIT

## 2019-09-20 PROCEDURE — 3331090001 HH PPS REVENUE CREDIT

## 2019-09-20 PROCEDURE — 3331090002 HH PPS REVENUE DEBIT

## 2019-09-21 PROCEDURE — 3331090001 HH PPS REVENUE CREDIT

## 2019-09-21 PROCEDURE — 3331090002 HH PPS REVENUE DEBIT

## 2019-09-22 PROCEDURE — 3331090002 HH PPS REVENUE DEBIT

## 2019-09-22 PROCEDURE — 3331090001 HH PPS REVENUE CREDIT

## 2019-09-23 PROCEDURE — 3331090002 HH PPS REVENUE DEBIT

## 2019-09-23 PROCEDURE — 3331090001 HH PPS REVENUE CREDIT

## 2019-09-24 ENCOUNTER — HOME CARE VISIT (OUTPATIENT)
Dept: SCHEDULING | Facility: HOME HEALTH | Age: 79
End: 2019-09-24
Payer: MEDICARE

## 2019-09-24 PROCEDURE — 3331090002 HH PPS REVENUE DEBIT

## 2019-09-24 PROCEDURE — 3331090001 HH PPS REVENUE CREDIT

## 2019-09-24 PROCEDURE — G0157 HHC PT ASSISTANT EA 15: HCPCS

## 2019-09-25 VITALS
SYSTOLIC BLOOD PRESSURE: 112 MMHG | HEART RATE: 68 BPM | RESPIRATION RATE: 16 BRPM | DIASTOLIC BLOOD PRESSURE: 64 MMHG | TEMPERATURE: 98.3 F | OXYGEN SATURATION: 98 %

## 2019-09-25 PROCEDURE — 3331090002 HH PPS REVENUE DEBIT

## 2019-09-25 PROCEDURE — 3331090001 HH PPS REVENUE CREDIT

## 2019-09-26 PROCEDURE — 3331090002 HH PPS REVENUE DEBIT

## 2019-09-26 PROCEDURE — 3331090001 HH PPS REVENUE CREDIT

## 2019-09-27 ENCOUNTER — HOME CARE VISIT (OUTPATIENT)
Dept: SCHEDULING | Facility: HOME HEALTH | Age: 79
End: 2019-09-27
Payer: MEDICARE

## 2019-09-27 VITALS
TEMPERATURE: 98.5 F | SYSTOLIC BLOOD PRESSURE: 128 MMHG | HEART RATE: 51 BPM | OXYGEN SATURATION: 98 % | DIASTOLIC BLOOD PRESSURE: 84 MMHG

## 2019-09-27 PROCEDURE — 3331090001 HH PPS REVENUE CREDIT

## 2019-09-27 PROCEDURE — G0157 HHC PT ASSISTANT EA 15: HCPCS

## 2019-09-27 PROCEDURE — 3331090002 HH PPS REVENUE DEBIT

## 2019-09-28 PROCEDURE — 3331090001 HH PPS REVENUE CREDIT

## 2019-09-28 PROCEDURE — 3331090002 HH PPS REVENUE DEBIT

## 2019-09-29 PROCEDURE — 3331090001 HH PPS REVENUE CREDIT

## 2019-09-29 PROCEDURE — 3331090002 HH PPS REVENUE DEBIT

## 2019-09-30 ENCOUNTER — HOME CARE VISIT (OUTPATIENT)
Dept: SCHEDULING | Facility: HOME HEALTH | Age: 79
End: 2019-09-30
Payer: MEDICARE

## 2019-09-30 PROCEDURE — 3331090001 HH PPS REVENUE CREDIT

## 2019-09-30 PROCEDURE — 3331090002 HH PPS REVENUE DEBIT

## 2019-09-30 PROCEDURE — G0157 HHC PT ASSISTANT EA 15: HCPCS

## 2019-10-01 VITALS
SYSTOLIC BLOOD PRESSURE: 124 MMHG | HEART RATE: 57 BPM | DIASTOLIC BLOOD PRESSURE: 84 MMHG | OXYGEN SATURATION: 97 % | TEMPERATURE: 98.1 F | RESPIRATION RATE: 17 BRPM

## 2019-10-01 PROCEDURE — 3331090002 HH PPS REVENUE DEBIT

## 2019-10-01 PROCEDURE — 3331090001 HH PPS REVENUE CREDIT

## 2019-10-02 PROCEDURE — 3331090002 HH PPS REVENUE DEBIT

## 2019-10-02 PROCEDURE — 3331090001 HH PPS REVENUE CREDIT

## 2019-10-03 ENCOUNTER — HOME CARE VISIT (OUTPATIENT)
Dept: SCHEDULING | Facility: HOME HEALTH | Age: 79
End: 2019-10-03
Payer: MEDICARE

## 2019-10-03 VITALS
SYSTOLIC BLOOD PRESSURE: 110 MMHG | TEMPERATURE: 98.2 F | DIASTOLIC BLOOD PRESSURE: 56 MMHG | HEART RATE: 67 BPM | OXYGEN SATURATION: 97 % | RESPIRATION RATE: 17 BRPM

## 2019-10-03 PROCEDURE — 3331090001 HH PPS REVENUE CREDIT

## 2019-10-03 PROCEDURE — G0157 HHC PT ASSISTANT EA 15: HCPCS

## 2019-10-03 PROCEDURE — 3331090002 HH PPS REVENUE DEBIT

## 2019-10-04 PROCEDURE — 3331090002 HH PPS REVENUE DEBIT

## 2019-10-04 PROCEDURE — 3331090001 HH PPS REVENUE CREDIT

## 2019-10-05 PROCEDURE — 3331090002 HH PPS REVENUE DEBIT

## 2019-10-05 PROCEDURE — 3331090001 HH PPS REVENUE CREDIT

## 2019-10-06 PROCEDURE — 3331090002 HH PPS REVENUE DEBIT

## 2019-10-06 PROCEDURE — 3331090001 HH PPS REVENUE CREDIT

## 2019-10-07 PROCEDURE — 3331090001 HH PPS REVENUE CREDIT

## 2019-10-07 PROCEDURE — 3331090002 HH PPS REVENUE DEBIT

## 2019-10-08 ENCOUNTER — HOME CARE VISIT (OUTPATIENT)
Dept: SCHEDULING | Facility: HOME HEALTH | Age: 79
End: 2019-10-08
Payer: MEDICARE

## 2019-10-08 PROCEDURE — G0157 HHC PT ASSISTANT EA 15: HCPCS

## 2019-10-08 PROCEDURE — 3331090001 HH PPS REVENUE CREDIT

## 2019-10-08 PROCEDURE — 3331090002 HH PPS REVENUE DEBIT

## 2019-10-09 VITALS
DIASTOLIC BLOOD PRESSURE: 70 MMHG | HEART RATE: 56 BPM | SYSTOLIC BLOOD PRESSURE: 122 MMHG | OXYGEN SATURATION: 98 % | TEMPERATURE: 98.2 F

## 2019-10-09 PROCEDURE — 3331090002 HH PPS REVENUE DEBIT

## 2019-10-09 PROCEDURE — 3331090001 HH PPS REVENUE CREDIT

## 2019-10-10 PROCEDURE — 3331090002 HH PPS REVENUE DEBIT

## 2019-10-10 PROCEDURE — 3331090001 HH PPS REVENUE CREDIT

## 2019-10-11 ENCOUNTER — HOME CARE VISIT (OUTPATIENT)
Dept: SCHEDULING | Facility: HOME HEALTH | Age: 79
End: 2019-10-11
Payer: MEDICARE

## 2019-10-11 ENCOUNTER — HOME CARE VISIT (OUTPATIENT)
Dept: HOME HEALTH SERVICES | Facility: HOME HEALTH | Age: 79
End: 2019-10-11
Payer: MEDICARE

## 2019-10-11 PROCEDURE — G0151 HHCP-SERV OF PT,EA 15 MIN: HCPCS

## 2019-10-11 PROCEDURE — 3331090001 HH PPS REVENUE CREDIT

## 2019-10-11 PROCEDURE — 3331090002 HH PPS REVENUE DEBIT

## 2019-10-12 VITALS
RESPIRATION RATE: 16 BRPM | SYSTOLIC BLOOD PRESSURE: 106 MMHG | OXYGEN SATURATION: 97 % | DIASTOLIC BLOOD PRESSURE: 60 MMHG | HEART RATE: 56 BPM | TEMPERATURE: 97 F

## 2019-10-12 PROCEDURE — 3331090002 HH PPS REVENUE DEBIT

## 2019-10-12 PROCEDURE — 3331090001 HH PPS REVENUE CREDIT

## 2019-10-13 PROCEDURE — 3331090001 HH PPS REVENUE CREDIT

## 2019-10-13 PROCEDURE — 3331090002 HH PPS REVENUE DEBIT

## 2020-03-08 ENCOUNTER — APPOINTMENT (OUTPATIENT)
Dept: CT IMAGING | Age: 80
End: 2020-03-08
Attending: EMERGENCY MEDICINE
Payer: MEDICARE

## 2020-03-08 ENCOUNTER — HOSPITAL ENCOUNTER (EMERGENCY)
Age: 80
Discharge: HOME | End: 2020-03-09
Attending: EMERGENCY MEDICINE
Payer: MEDICARE

## 2020-03-08 ENCOUNTER — APPOINTMENT (OUTPATIENT)
Dept: GENERAL RADIOLOGY | Age: 80
End: 2020-03-08
Attending: EMERGENCY MEDICINE
Payer: MEDICARE

## 2020-03-08 ENCOUNTER — APPOINTMENT (OUTPATIENT)
Dept: MRI IMAGING | Age: 80
End: 2020-03-08
Attending: EMERGENCY MEDICINE
Payer: MEDICARE

## 2020-03-08 DIAGNOSIS — S12.100A CLOSED ODONTOID FRACTURE, INITIAL ENCOUNTER (HCC): ICD-10-CM

## 2020-03-08 DIAGNOSIS — S06.0X9A CONCUSSION WITH LOSS OF CONSCIOUSNESS, INITIAL ENCOUNTER: Primary | ICD-10-CM

## 2020-03-08 DIAGNOSIS — S00.81XA FACIAL ABRASION, INITIAL ENCOUNTER: ICD-10-CM

## 2020-03-08 LAB
ABO + RH BLD: NORMAL
ALBUMIN SERPL-MCNC: 3.5 G/DL (ref 3.5–5)
ALBUMIN/GLOB SERPL: 0.9 {RATIO} (ref 1.1–2.2)
ALP SERPL-CCNC: 88 U/L (ref 45–117)
ALT SERPL-CCNC: 19 U/L (ref 12–78)
ANION GAP SERPL CALC-SCNC: 6 MMOL/L (ref 5–15)
AST SERPL-CCNC: 19 U/L (ref 15–37)
BASOPHILS # BLD: 0 K/UL (ref 0–0.1)
BASOPHILS NFR BLD: 1 % (ref 0–1)
BILIRUB SERPL-MCNC: 0.5 MG/DL (ref 0.2–1)
BLOOD GROUP ANTIBODIES SERPL: NORMAL
BUN SERPL-MCNC: 21 MG/DL (ref 6–20)
BUN/CREAT SERPL: 21 (ref 12–20)
CALCIUM SERPL-MCNC: 8.8 MG/DL (ref 8.5–10.1)
CHLORIDE SERPL-SCNC: 103 MMOL/L (ref 97–108)
CO2 SERPL-SCNC: 29 MMOL/L (ref 21–32)
CREAT SERPL-MCNC: 1.02 MG/DL (ref 0.7–1.3)
DIFFERENTIAL METHOD BLD: ABNORMAL
EOSINOPHIL # BLD: 0.2 K/UL (ref 0–0.4)
EOSINOPHIL NFR BLD: 2 % (ref 0–7)
ERYTHROCYTE [DISTWIDTH] IN BLOOD BY AUTOMATED COUNT: 13.4 % (ref 11.5–14.5)
GLOBULIN SER CALC-MCNC: 3.7 G/DL (ref 2–4)
GLUCOSE SERPL-MCNC: 116 MG/DL (ref 65–100)
HCT VFR BLD AUTO: 35.6 % (ref 36.6–50.3)
HGB BLD-MCNC: 12 G/DL (ref 12.1–17)
IMM GRANULOCYTES # BLD AUTO: 0 K/UL (ref 0–0.04)
IMM GRANULOCYTES NFR BLD AUTO: 1 % (ref 0–0.5)
INR PPP: 1.1 (ref 0.9–1.1)
LYMPHOCYTES # BLD: 1.1 K/UL (ref 0.8–3.5)
LYMPHOCYTES NFR BLD: 13 % (ref 12–49)
MCH RBC QN AUTO: 31 PG (ref 26–34)
MCHC RBC AUTO-ENTMCNC: 33.7 G/DL (ref 30–36.5)
MCV RBC AUTO: 92 FL (ref 80–99)
MONOCYTES # BLD: 0.7 K/UL (ref 0–1)
MONOCYTES NFR BLD: 9 % (ref 5–13)
NEUTS SEG # BLD: 6.2 K/UL (ref 1.8–8)
NEUTS SEG NFR BLD: 74 % (ref 32–75)
NRBC # BLD: 0 K/UL (ref 0–0.01)
NRBC BLD-RTO: 0 PER 100 WBC
PLATELET # BLD AUTO: 228 K/UL (ref 150–400)
PMV BLD AUTO: 10.4 FL (ref 8.9–12.9)
POTASSIUM SERPL-SCNC: 3.7 MMOL/L (ref 3.5–5.1)
PROT SERPL-MCNC: 7.2 G/DL (ref 6.4–8.2)
PROTHROMBIN TIME: 10.9 SEC (ref 9–11.1)
RBC # BLD AUTO: 3.87 M/UL (ref 4.1–5.7)
SODIUM SERPL-SCNC: 138 MMOL/L (ref 136–145)
SPECIMEN EXP DATE BLD: NORMAL
WBC # BLD AUTO: 8.2 K/UL (ref 4.1–11.1)

## 2020-03-08 PROCEDURE — 85025 COMPLETE CBC W/AUTO DIFF WBC: CPT

## 2020-03-08 PROCEDURE — 85610 PROTHROMBIN TIME: CPT

## 2020-03-08 PROCEDURE — 74011250636 HC RX REV CODE- 250/636: Performed by: EMERGENCY MEDICINE

## 2020-03-08 PROCEDURE — 74011250637 HC RX REV CODE- 250/637: Performed by: EMERGENCY MEDICINE

## 2020-03-08 PROCEDURE — 93005 ELECTROCARDIOGRAM TRACING: CPT

## 2020-03-08 PROCEDURE — 96375 TX/PRO/DX INJ NEW DRUG ADDON: CPT

## 2020-03-08 PROCEDURE — 72040 X-RAY EXAM NECK SPINE 2-3 VW: CPT

## 2020-03-08 PROCEDURE — 70450 CT HEAD/BRAIN W/O DYE: CPT

## 2020-03-08 PROCEDURE — 96376 TX/PRO/DX INJ SAME DRUG ADON: CPT

## 2020-03-08 PROCEDURE — 72125 CT NECK SPINE W/O DYE: CPT

## 2020-03-08 PROCEDURE — 86900 BLOOD TYPING SEROLOGIC ABO: CPT

## 2020-03-08 PROCEDURE — 72141 MRI NECK SPINE W/O DYE: CPT

## 2020-03-08 PROCEDURE — 36415 COLL VENOUS BLD VENIPUNCTURE: CPT

## 2020-03-08 PROCEDURE — 96374 THER/PROPH/DIAG INJ IV PUSH: CPT

## 2020-03-08 PROCEDURE — 99285 EMERGENCY DEPT VISIT HI MDM: CPT

## 2020-03-08 PROCEDURE — 80053 COMPREHEN METABOLIC PANEL: CPT

## 2020-03-08 RX ORDER — LORAZEPAM 2 MG/ML
0.5 INJECTION INTRAMUSCULAR
Status: COMPLETED | OUTPATIENT
Start: 2020-03-08 | End: 2020-03-08

## 2020-03-08 RX ORDER — LORAZEPAM 2 MG/ML
0.5 INJECTION INTRAMUSCULAR ONCE
Status: COMPLETED | OUTPATIENT
Start: 2020-03-08 | End: 2020-03-08

## 2020-03-08 RX ORDER — ONDANSETRON 4 MG/1
4 TABLET, ORALLY DISINTEGRATING ORAL
Qty: 6 TAB | Refills: 0 | Status: SHIPPED | OUTPATIENT
Start: 2020-03-08

## 2020-03-08 RX ORDER — ACETAMINOPHEN 500 MG
1000 TABLET ORAL ONCE
Status: COMPLETED | OUTPATIENT
Start: 2020-03-08 | End: 2020-03-08

## 2020-03-08 RX ORDER — ONDANSETRON 2 MG/ML
8 INJECTION INTRAMUSCULAR; INTRAVENOUS ONCE
Status: COMPLETED | OUTPATIENT
Start: 2020-03-08 | End: 2020-03-08

## 2020-03-08 RX ORDER — SODIUM CHLORIDE 9 MG/ML
150 INJECTION, SOLUTION INTRAVENOUS ONCE
Status: COMPLETED | OUTPATIENT
Start: 2020-03-08 | End: 2020-03-09

## 2020-03-08 RX ADMIN — LORAZEPAM 0.5 MG: 2 INJECTION INTRAMUSCULAR; INTRAVENOUS at 22:42

## 2020-03-08 RX ADMIN — LORAZEPAM 0.5 MG: 2 INJECTION INTRAMUSCULAR; INTRAVENOUS at 21:51

## 2020-03-08 RX ADMIN — ONDANSETRON 8 MG: 2 INJECTION INTRAMUSCULAR; INTRAVENOUS at 16:03

## 2020-03-08 RX ADMIN — ACETAMINOPHEN 1000 MG: 500 TABLET ORAL at 19:33

## 2020-03-08 RX ADMIN — SODIUM CHLORIDE 150 ML/HR: 900 INJECTION, SOLUTION INTRAVENOUS at 16:40

## 2020-03-08 RX ADMIN — LORAZEPAM 0.5 MG: 2 INJECTION INTRAMUSCULAR; INTRAVENOUS at 23:23

## 2020-03-08 RX ADMIN — LORAZEPAM 0.5 MG: 2 INJECTION INTRAMUSCULAR; INTRAVENOUS at 23:53

## 2020-03-08 NOTE — ED PROVIDER NOTES
EMERGENCY DEPARTMENT HISTORY AND PHYSICAL EXAM          Date: 3/8/2020  Patient Name: Wayne Morrison    History of Presenting Illness     Chief Complaint   Patient presents with   Leontine Po     got dizzy while working in the yard fell and hit his head on stump LOC for 20secs per grandson who was with him. Alert and oriented x4.  Vomiting     dizziness and vomiting since fall hx of vertigo       History Provided By: Patient and EMS    HPI: Wayne Morrison is a 78 y.o. male, pmhx diabetes, hypertension, high cholesterol, arthritis, who presents EMS to the ED c/o fall    Patient was out working in the [employer] for approximately 45 minutes today with his grandson. He bent over to  a log when he felt weird and lost consciousness falling to the ground. His grandson was at his side and states he was unconscious for a few seconds and seemed a little disoriented when he woke up; thus EMS was contacted. On medic arrival they found him to be alert and oriented without any complaints. His blood sugar was normal and his vital signs were stable and they brought him to the ER without event. Patient specifically denies any recent fevers, chills, nausea, vomiting, diarrhea, abd pain, CP, SOB, urinary sxs, changes in BM, or headache and states he has been feeling well. He currently denies any neck pain, chest pain, abdominal pain, back pain, hip pain or leg pain. PCP: Giancarlo Hough MD    Allergies: Amoxicillin  Social Hx: Former tobacco, -vaping, -EtOH, -Illicit Drugs; Lives with his wife    There are no other complaints, changes, or physical findings at this time. Current Outpatient Medications   Medication Sig Dispense Refill    ondansetron (ZOFRAN ODT) 4 mg disintegrating tablet Take 1 Tab by mouth every eight (8) hours as needed for Nausea. 6 Tab 0    acetaminophen (TYLENOL) 500 mg tablet Take 500 mg by mouth every six (6) hours as needed for Pain.       valsartan-hydroCHLOROthiazide (DIOVAN-HCT) 320-12.5 mg per tablet Take 1 Tab by mouth daily. Indications: hypertension      terazosin (HYTRIN) 10 mg capsule Take 10 mg by mouth nightly.  PARoxetine (PAXIL) 20 mg tablet Take 20 mg by mouth daily.  atorvastatin (LIPITOR) 10 mg tablet Take 10 mg by mouth daily.  metFORMIN (GLUCOPHAGE) 1,000 mg tablet Take 1,000 mg by mouth two (2) times daily (with meals).  bimatoprost (LUMIGAN) 0.03 % ophthalmic drops Administer 1 Drop to left eye nightly.  ferrous sulfate (FEOSOL) 325 mg (65 mg iron) tablet Take 325 mg by mouth daily.  meclizine (ANTIVERT) 25 mg tablet Take 25 mg by mouth three (3) times daily as needed for Dizziness.  senna-docusate (PERICOLACE) 8.6-50 mg per tablet Take 1 Tab by mouth daily. 30 Tab 0    mupirocin calcium (BACTROBAN NASAL) 2 % nasal ointment by Both Nostrils route two (2) times a day. Pt to start 03/02/2017 BID X 5 days for Apparent Staph.  EPINEPHrine (EPIPEN) 0.3 mg/0.3 mL injection 0.3 mL by IntraMUSCular route once as needed for up to 1 dose. 1 Syringe 0    dorzolamide-timolol (COSOPT) 2-0.5 % ophthalmic solution Administer 1 Drop to left eye two (2) times a day. Past History     Past Medical History:  Past Medical History:   Diagnosis Date    Arthritis     Diabetes ([address]     Hypercholesteremia     Hypertension     Ill-defined condition     clausterphobia       Past Surgical History:  Past Surgical History:   Procedure Laterality Date    HX CYST REMOVAL      HX KNEE ARTHROSCOPY Left     HX ORTHOPAEDIC      carpal tunnel surgery       Family History:  Family History   Problem Relation Age of Onset    Cancer Mother     Cancer Father     Cancer Sister         brain tumor       Social History:  Social History     Tobacco Use    Smoking status: Former Smoker     Packs/day: 1.00     Years: 1.00     Pack years: 1.00    Smokeless tobacco: Never Used   Substance Use Topics    Alcohol use: No    Drug use:  No Allergies: Allergies   Allergen Reactions    Amoxicillin Rash         Review of Systems   Review of Systems   Constitutional: Negative for activity change, appetite change, chills, fever and unexpected weight change. HENT: Negative for congestion. Eyes: Negative for pain and visual disturbance. Respiratory: Negative for cough and shortness of breath. Cardiovascular: Negative for chest pain. Gastrointestinal: Negative for abdominal pain, diarrhea, nausea and vomiting. Genitourinary: Negative for dysuria. Musculoskeletal: Negative for back pain. Skin: Negative for rash. Neurological: Negative for dizziness, seizures, speech difficulty, weakness, light-headedness, numbness and headaches. Physical Exam   Physical Exam  Vitals signs and nursing note reviewed. Constitutional:       Appearance: He is well-developed. He is not diaphoretic. Comments: Elderly male currently in mild distress   HENT:      Head: Normocephalic. Comments: Abrasions left frontal scalp in the supraorbital region with underlying hematoma, abrasion of the chin. Eyes:      General:         Right eye: No discharge. Left eye: No discharge. Conjunctiva/sclera: Conjunctivae normal.      Pupils: Pupils are equal, round, and reactive to light. Neck:      Musculoskeletal: Normal range of motion and neck supple. No neck rigidity or muscular tenderness. Vascular: No carotid bruit. Cardiovascular:      Rate and Rhythm: Normal rate and regular rhythm. Heart sounds: Normal heart sounds. No murmur. No friction rub. No gallop. Pulmonary:      Effort: Pulmonary effort is normal. No respiratory distress. Breath sounds: Normal breath sounds. No stridor. No wheezing, rhonchi or rales. Chest:      Chest wall: No tenderness. Abdominal:      General: Bowel sounds are normal. There is no distension. Palpations: Abdomen is soft. There is no mass. Tenderness:  There is no abdominal tenderness. There is no guarding or rebound. Hernia: No hernia is present. Musculoskeletal: Normal range of motion. General: No swelling, tenderness, deformity or signs of injury. Lymphadenopathy:      Cervical: No cervical adenopathy. Skin:     General: Skin is warm and dry. Capillary Refill: Capillary refill takes less than 2 seconds. Coloration: Skin is not pale. Findings: No rash. Neurological:      General: No focal deficit present. Mental Status: He is alert and oriented to person, place, and time. Cranial Nerves: No cranial nerve deficit. Motor: No weakness or abnormal muscle tone. Coordination: Coordination normal.       Diagnostic Study Results     Labs -     Recent Results (from the past 12 hour(s))   EKG, 12 LEAD, INITIAL    Collection Time: 03/08/20  3:44 PM   Result Value Ref Range    Ventricular Rate 64 BPM    Atrial Rate 64 BPM    P-R Interval 182 ms    QRS Duration 136 ms    Q-T Interval 466 ms    QTC Calculation (Bezet) 480 ms    Calculated P Axis 26 degrees    Calculated R Axis 8 degrees    Calculated T Axis 15 degrees    Diagnosis       Normal sinus rhythm  Nonspecific intraventricular block  When compared with ECG of 28-FEB-2017 09:51,  Nonspecific intraventricular block has replaced Right bundle branch block  QT has lengthened     CBC WITH AUTOMATED DIFF    Collection Time: 03/08/20  4:14 PM   Result Value Ref Range    WBC 8.2 4.1 - 11.1 K/uL    RBC 3.87 (L) 4.10 - 5.70 M/uL    HGB 12.0 (L) 12.1 - 17.0 g/dL    HCT 35.6 (L) 36.6 - 50.3 %    MCV 92.0 80.0 - 99.0 FL    MCH 31.0 26.0 - 34.0 PG    MCHC 33.7 30.0 - 36.5 g/dL    RDW 13.4 11.5 - 14.5 %    PLATELET 933 574 - 677 K/uL    MPV 10.4 8.9 - 12.9 FL    NRBC 0.0 0  WBC    ABSOLUTE NRBC 0.00 0.00 - 0.01 K/uL    NEUTROPHILS 74 32 - 75 %    LYMPHOCYTES 13 12 - 49 %    MONOCYTES 9 5 - 13 %    EOSINOPHILS 2 0 - 7 %    BASOPHILS 1 0 - 1 %    IMMATURE GRANULOCYTES 1 (H) 0.0 - 0.5 %    ABS. NEUTROPHILS 6.2 1.8 - 8.0 K/UL    ABS. LYMPHOCYTES 1.1 0.8 - 3.5 K/UL    ABS. MONOCYTES 0.7 0.0 - 1.0 K/UL    ABS. EOSINOPHILS 0.2 0.0 - 0.4 K/UL    ABS. BASOPHILS 0.0 0.0 - 0.1 K/UL    ABS. IMM. GRANS. 0.0 0.00 - 0.04 K/UL    DF AUTOMATED     PROTHROMBIN TIME + INR    Collection Time: 03/08/20  4:14 PM   Result Value Ref Range    INR 1.1 0.9 - 1.1      Prothrombin time 10.9 9.0 - 11.1 sec   TYPE & SCREEN    Collection Time: 03/08/20  4:14 PM   Result Value Ref Range    Crossmatch Expiration 03/11/2020     ABO/Rh(D) A POSITIVE     Antibody screen NEG    METABOLIC PANEL, COMPREHENSIVE    Collection Time: 03/08/20  4:14 PM   Result Value Ref Range    Sodium 138 136 - 145 mmol/L    Potassium 3.7 3.5 - 5.1 mmol/L    Chloride 103 97 - 108 mmol/L    CO2 29 21 - 32 mmol/L    Anion gap 6 5 - 15 mmol/L    Glucose 116 (H) 65 - 100 mg/dL    BUN 21 (H) 6 - 20 MG/DL    Creatinine 1.02 0.70 - 1.30 MG/DL    BUN/Creatinine ratio 21 (H) 12 - 20      GFR est AA >60 >60 ml/min/1.73m2    GFR est non-AA >60 >60 ml/min/1.73m2    Calcium 8.8 8.5 - 10.1 MG/DL    Bilirubin, total 0.5 0.2 - 1.0 MG/DL    ALT (SGPT) 19 12 - 78 U/L    AST (SGOT) 19 15 - 37 U/L    Alk. phosphatase 88 45 - 117 U/L    Protein, total 7.2 6.4 - 8.2 g/dL    Albumin 3.5 3.5 - 5.0 g/dL    Globulin 3.7 2.0 - 4.0 g/dL    A-G Ratio 0.9 (L) 1.1 - 2.2         Radiologic Studies -   CT SPINE CERV WO CONT   Final Result   IMPRESSION:      1. Type II odontoid fracture. 2. Multilevel degenerative changes. The findings were called to Dr. Scott Reyna on March 18, 2020 at 8:56 PM by myself. [id number]      XR SPINE CERV PA LAT ODONT 3 V MAX   Final Result   IMPRESSION:    No displaced fracture or subluxation on a 3 projection cervical spine series. Because of the extensive degenerative and probable postsurgical changes, a   subtle fracture cannot be excluded. Odontoid process suboptimally seen. CT of the cervical spine is recommended for further evaluation.          CT HEAD WO CONT   Final Result   IMPRESSION: No acute intracranial disease. CT Results  (Last 48 hours)               03/08/20 2008  CT SPINE CERV WO CONT Final result    Impression:  IMPRESSION:       1. Type II odontoid fracture. 2. Multilevel degenerative changes. The findings were called to Dr. Maverick Charles on March 18, 2020 at 8:56 PM by myself. [id number]       Narrative:  EXAM:  CT CERVICAL SPINE WITHOUT CONTRAST       INDICATION: r/o trauma. COMPARISON: None. CONTRAST:  None. TECHNIQUE: Multislice helical CT of the cervical spine was performed without   intravenous contrast administration. Sagittal and coronal reconstructions were   generated. CT dose reduction was achieved through use of a standardized   protocol tailored for this examination and automatic exposure control for dose   modulation. FINDINGS:       The alignment is within normal limits. There is an age indeterminate   transversely oriented fracture through the base of the odontoid. The   craniocervical junction is within normal limits. The prevertebral soft tissues   are within normal limits. There is calcification of the transverse ligament. C2-C3: Disc space narrowing. Facet arthropathy and uncinate process hypertrophy   with bilateral neural foraminal narrowing. C3-C4: Bilateral facet arthropathy, greater on the right. Disc space narrowing. Uncinate process hypertrophy. Neural foraminal narrowing, greater on the right. C4-C5: Significant disc space narrowing. Uncinate process hypertrophy and facet   arthropathy, more so on the right, with bilateral neural foraminal narrowing. C5-C6: Disc space narrowing. Retrolisthesis. Uncinate process hypertrophy with   facet arthropathy and neural foraminal narrowing, greater on the right. C6-C7: Disc space narrowing. Uncinate process hypertrophy bilaterally with   bilateral neural foraminal narrowing. C7-T1: Disc space narrowing. 03/08/20 1633  CT HEAD WO CONT Final result    Impression:  IMPRESSION: No acute intracranial disease. Narrative:  EXAM: Head CT without Contrast:    CT dose reduction was achieved through use of a standardized protocol tailored   for this examination and automatic exposure control for dose modulation. INDICATION:  trauma, vomiting        Unenhanced Head CT shows no mass, bleed, shift, hydrocephalus or extra-axial   fluid collection. No acute infarct is apparent. Cerebral white matter   hypodensity is noted, favoring chronic microangiopathy. Bone windows are   unremarkable. CXR Results  (Last 48 hours)    None            Medical Decision Making   I am the first provider for this patient. I reviewed the vital signs, available nursing notes, past medical history, past surgical history, family history and social history. Vital Signs-Reviewed the patient's vital signs. Patient Vitals for the past 12 hrs:   Temp Pulse Resp BP SpO2   03/08/20 2100  69 16 149/86 96 %   03/08/20 1900  71 14 (!) 173/118 98 %   03/08/20 1852 97.8 °F (36.6 °C) 67 18 144/78 96 %   03/08/20 1730  61 12 139/73 95 %   03/08/20 1700  (!) 59 13 134/79 93 %   03/08/20 1641  61 14 141/72 99 %   03/08/20 1600  70 17 146/68 97 %   03/08/20 1541 98 °F (36.7 °C) 65 18 130/71 99 %       Pulse Oximetry Analysis - 98% on RA    Cardiac Monitor:   Rate: 70bpm  Rhythm: Normal Sinus Rhythm      Records Reviewed: Nursing Notes, Old Medical Records, Ambulance Run Sheet, Previous Radiology Studies and Previous Laboratory Studies    Provider Notes (Medical Decision Making):   MDM: Elderly male status post fall with a short period of KO. Currently patient is having nausea otherwise denying any pain and is awake and alert with responsive pupils. Will initiate CT head evaluation with close monitoring and symptomatic management. ED Course:   Initial assessment performed.  The patients presenting problems have been discussed, and they are in agreement with the care plan formulated and outlined with them. I have encouraged them to ask questions as they arise throughout their visit. EKG interpretation: (Preliminary)  Rhythm: Normal sinus rhythm at a regular rate of 64 bpm; normal IA; widened QRS with nonspecific block; appears unchanged from 2017. This EKG was interpreted by ED Provider Anup Werner MD    PROGRESS NOTE:  5 PM  Pt appears improved without further vomiting. He is now started complaining of some neck pain. X-rays order placed. 7:30 PM  Patient spouse at bedside and he is complaining of headache. He has appears he has not received the Tylenol ordered earlier. Explained his x-ray results and recommendations from radiology for him to have a CT scan. He is slightly agitated given his pain and his blood pressure is elevated but his wife agrees to the scan. 9 PM  Received call from radiology that patient has a type II odontoid fracture. Consult is placed to neurosurgery. Patient and his wife have been updated regarding results. Requesting c-collar to be placed. CONSULT NOTE:   9:11 PM  Anup Werner MD spoke with Dr. Regi Garcia,   Specialty: Neurosurgery  Discussed pt's hx, disposition, and available diagnostic and imaging results. Reviewed care plans. Consultant agrees with plans as outlined. She states that patient will need an MRI and if he continues to have any dizziness or nausea and vomiting a CT angios to look for any arterial injury. 9:30 PM  Patient appears agitated from c-collar but continues to deny any further nausea or vomiting or dizziness after the Zofran he was given upon arrival.  After long discussion with Dr. Regi Garcia as well as the patient and his spouse. Decision would be for patient the best option is admission to the hospitalist with MRI in the morning using sedation as he states he will not be able to tolerate it without heavy medications.   Page placed to the hospitalist.    CONSULT NOTE:   Kobe Morrison 73 PM  Andra Mathews MD spoke with Dr. Karthik Trivedi,   Specialty: Hospitalist  Discussed pt's hx, disposition, and available diagnostic and imaging results. Reviewed care plans. Consultant agrees with plans as outlined. He does not feel that this is an appropriate admission and would like patient to get that MRI tonight. 10:30 PM  I have discussed the case at length with our on-call radiologist as well as the patient himself (at least 45 minutes arranging plan). Patient states he can only do an MRI with sedation and he cannot be admitted for this is is arranged as an outpatient. His wife is left thinking he was going to be admitted and he does not want to go home. We will give him doses of Ativan now and before the MRI and the nurse will go over to MRI with him with a third dose if patient should need it. MRI has been approved to be completed tonight.    ===========================  Patient was signed out to me by Dr. Mag Hansen. He had significant difficulty undergoing MRI, apparently was unable to hold still enough for good quality images. Within these limitations, there is some concern for possible acute odontoid fracture. I reconsulted Dr. Avi Dsouza, neurosurgery, who recommends outpatient follow-up in her office in 2 weeks. Patient is instructed to wear Aspen collar until that time. There is no further indication for hospitalization. Patient understands this plan and is comfortable discharging home. Vin Braun MD  6:08 AM          Critical Care Time:   0      Diagnosis     Clinical Impression:   1. Concussion with loss of consciousness, initial encounter    2. Facial abrasion, initial encounter    3. Closed odontoid fracture, initial encounter ([facility] [address]        PLAN:  MRI C-spine to rule out acute odontoid fracture. Please note, this dictation was completed with [employer], the computer voice recognition software.  Quite often unanticipated grammatical, syntax, homophones, and other interpretive errors are inadvertently transcribed by the computer software. Please disregard these errors. Please excuse any errors that have escaped final proof reading.

## 2020-03-08 NOTE — ED TRIAGE NOTES
Pt states he was walking in the [place] with his [place]. He started to feel dizzy and then fll and hit his head on a stump. Pt son states that he was LOC for about 20 seconds. Pt has been N/V. Pt BS was 106.

## 2020-03-08 NOTE — ED NOTES
Bedside and Verbal shift change report given to Vanda Wallace (oncoming nurse) by Lula St (offgoing nurse). Report included the following information SBAR, ED Summary and MAR.

## 2020-03-08 NOTE — ED NOTES
Report received from Richardson Diane RN. They advised of the patient's chief complaint, current status, orders completed (to include IV access/medications/radiology testing), outstanding orders that still need to be completed, and the treatment plan. Questions asked and answered prior to assumption of care.

## 2020-03-08 NOTE — DISCHARGE INSTRUCTIONS
You have a small fracture of one of your vertebrae. You are instructed to keep the cervical collar on at all times for the next few weeks, until you have met with Dr. Pako Farrell (neurosurgery) in her office. At that time she will advise you whether or not to continue wearing the collar. You are at risk for spinal cord injury if you remove the collar. Patient Education        Concussion: Care Instructions  Your Care Instructions    A concussion is a kind of injury to the brain. It happens when the head receives a hard blow. The impact can jar or shake the brain against the skull. This interrupts the brain's normal activities. Although you may have cuts or bruises on your head or face, you may have no other visible signs of a brain injury. In most cases, damage to the brain from a concussion can't be seen in tests such as a CT or MRI scan. For a few weeks, you may have low energy, dizziness, trouble sleeping, a headache, ringing in your ears, or nausea. You may also feel anxious, grumpy, or depressed. You may have problems with memory and concentration. These symptoms are common after a concussion. They should slowly improve over time. Sometimes this takes weeks or even months. Someone who lives with you should know how to care for you. Please share this and all information with a caregiver who will be available to help if needed. Follow-up care is a key part of your treatment and safety. Be sure to make and go to all appointments, and call your doctor if you are having problems. It's also a good idea to know your test results and keep a list of the medicines you take. How can you care for yourself at home? Pain control  Put ice or a cold pack on the part of your head that hurts for 10 to 20 minutes at a time. Put a thin cloth between the ice and your skin. Be safe with medicines. Read and follow all instructions on the label. If the doctor gave you a prescription medicine for pain, take it as prescribed.   If you are not taking a prescription pain medicine, ask your doctor if you can take an over-the-counter medicine. Recovery  Follow your doctor's instructions. He or she will tell you if you need someone to watch you closely for the next 24 hours or longer. Rest is the best way to recover from a concussion. You need to rest your body and your brain:  Get plenty of sleep at night. And take rest breaks during the day. Avoid activities that take a lot of physical or mental work. This includes housework, exercise, schoolwork, video games, text messaging, and using the computer. You may need to change your school or work schedule while you recover. Return to your normal activities slowly. Do not try to do too much at once. Do not drink alcohol or use illegal drugs. Alcohol and illegal drugs can slow your recovery. And they can increase your risk of a second brain injury. Avoid activities that could lead to another concussion. Follow your doctor's instructions for a gradual return to activity and sports. Ask your doctor when it's okay for you to drive a car, ride a bike, or operate machinery. How should you return to activity? Your return to activity can begin after 1 to 2 days of physical and mental rest. After resting, you can gradually increase your activity as long as it does not cause new symptoms or worsen your symptoms. Doctors and concussion specialists suggest steps to follow for returning to sports after a concussion. Use these steps as a guide. You should slowly progress through the following levels of activity:  Limited activity. You can take part in daily activities as long as the activity doesn't increase your symptoms or cause new symptoms. Light aerobic activity. This can include walking, swimming, or other exercise at less than 70% of maximum heart rate. No resistance training is included in this step. Sport-specific exercise.  This includes running drills or skating drills (depending on the sport), but no head impact. Noncontact training drills. This includes more complex training drills such as passing. The athlete may also begin light resistance training. Full-contact practice. The athlete can participate in normal training. Return to normal game play. This is the final step and allows the athlete to join in normal game play. Watch and keep track of your progress. It should take at least 6 days for you to go from light activity to normal game play. Make sure that you can stay at each new level of activity for at least 24 hours without symptoms, or as long as your doctor says, before doing more. If one or more symptoms come back, return to a lower level of activity for at least 24 hours. Don't move on until all symptoms are gone. When should you call for help? Call 911 anytime you think you may need emergency care. For example, call if:    You have a seizure. You passed out (lost consciousness). You are confused or can't stay awake. Call your doctor now or seek immediate medical care if:    You have new or worse vomiting. You feel less alert. You have new weakness or numbness in any part of your body. Watch closely for changes in your health, and be sure to contact your doctor if:    You do not get better as expected. You have new symptoms, such as headaches, trouble concentrating, or changes in mood. Where can you learn more? Go to http://sonny-stefanie.info/. Enter U475 in the search box to learn more about \"Concussion: Care Instructions. \"  Current as of: March 28, 2019  Content Version: 12.2  © 4428-1872 [employer]. Care instructions adapted under license by [employer] (which disclaims liability or warranty for this information).  If you have questions about a medical condition or this instruction, always ask your healthcare professional. [place] [address] any warranty or liability for your use of this information.

## 2020-03-09 VITALS
OXYGEN SATURATION: 95 % | HEART RATE: 58 BPM | SYSTOLIC BLOOD PRESSURE: 124 MMHG | TEMPERATURE: 98.2 F | RESPIRATION RATE: 13 BRPM | DIASTOLIC BLOOD PRESSURE: 74 MMHG

## 2020-03-09 LAB
ATRIAL RATE: 64 BPM
CALCULATED P AXIS, ECG09: 26 DEGREES
CALCULATED R AXIS, ECG10: 8 DEGREES
CALCULATED T AXIS, ECG11: 15 DEGREES
DIAGNOSIS, 93000: NORMAL
P-R INTERVAL, ECG05: 182 MS
Q-T INTERVAL, ECG07: 466 MS
QRS DURATION, ECG06: 136 MS
QTC CALCULATION (BEZET), ECG08: 480 MS
VENTRICULAR RATE, ECG03: 64 BPM

## 2020-03-09 PROCEDURE — 74011250637 HC RX REV CODE- 250/637: Performed by: EMERGENCY MEDICINE

## 2020-03-09 RX ORDER — ACETAMINOPHEN 325 MG/1
650 TABLET ORAL
Status: COMPLETED | OUTPATIENT
Start: 2020-03-09 | End: 2020-03-09

## 2020-03-09 RX ADMIN — ACETAMINOPHEN 650 MG: 325 TABLET ORAL at 07:09

## 2020-03-09 NOTE — ED NOTES
Called to MRI to give another dose of Ativan to help with patient's claustrophobia. Patient willing to continue trying scan.

## 2020-03-09 NOTE — ED NOTES
Patient to MRI by stretcher, tech instructed to call if patient needs more medicine for claustrophobia

## 2020-03-09 NOTE — ED NOTES
Called wife to come pick patient up for discharge, no answer. Left a message and call back number, will try again.

## 2020-03-09 NOTE — ED NOTES
Patient back from MRI. Per technician, patient still had a hard time staying still for the duration of the scan.  Will notify MD.

## 2020-03-09 NOTE — ED NOTES
Called left wife message patient ready for d/c home,  [address] left message  0900  Marilynn howell to assist with ride home

## 2020-03-09 NOTE — PROGRESS NOTES
Charge nurse request regarding patient's wife who was supposed to come to ER to  her  at 0700. Staff unable to reach wife on home phone [phone]), several messages left. CM also attempted home number with no answer. CM placed call to patient's PCP office (Dr. Nick Dye), provided with a cell number by PCP office staff for [phone]. CM able to reach patient's wife at above number. Patient's wife stated she had just arrived to ER to  her .     Alicia Santos, RN, BSN  [facility]  [phone]

## 2020-03-09 NOTE — ED NOTES
Report given to MARINA CASTILLO. They were informed of patient chief complaint, current status, orders completed (to include IV access/medications/radiology testing), outstanding orders that still need to be completed, and the treatment plan. Ensured no questions or concerns regarding the patient prior to departure. Oncoming nurse aware that we are trying to reach patient's wife for discharge.

## 2022-03-19 PROBLEM — M17.12 PRIMARY LOCALIZED OSTEOARTHRITIS OF LEFT KNEE: Status: ACTIVE | Noted: 2017-03-13

## 2022-03-19 PROBLEM — M17.12 LOCALIZED PRIMARY OSTEOARTHRITIS OF LEFT LOWER LEG: Status: ACTIVE | Noted: 2017-03-13

## 2023-01-12 ENCOUNTER — OFFICE VISIT (OUTPATIENT)
Dept: SURGERY | Age: 83
End: 2023-01-12
Payer: MEDICARE

## 2023-01-12 ENCOUNTER — HOSPITAL ENCOUNTER (OUTPATIENT)
Dept: GENERAL RADIOLOGY | Age: 83
Discharge: HOME | End: 2023-01-12

## 2023-01-12 ENCOUNTER — TRANSCRIBE ORDER (OUTPATIENT)
Dept: REGISTRATION | Age: 83
End: 2023-01-12

## 2023-01-12 VITALS
DIASTOLIC BLOOD PRESSURE: 65 MMHG | HEART RATE: 67 BPM | BODY MASS INDEX: 23.59 KG/M2 | SYSTOLIC BLOOD PRESSURE: 99 MMHG | OXYGEN SATURATION: 98 % | RESPIRATION RATE: 16 BRPM | TEMPERATURE: 97 F | WEIGHT: 164.8 LBS | HEIGHT: 70 IN

## 2023-01-12 DIAGNOSIS — R10.31 RIGHT INGUINAL PAIN: Primary | ICD-10-CM

## 2023-01-12 DIAGNOSIS — M54.2 NECK PAIN: Primary | ICD-10-CM

## 2023-01-12 DIAGNOSIS — M54.2 NECK PAIN: ICD-10-CM

## 2023-01-12 PROCEDURE — G8420 CALC BMI NORM PARAMETERS: HCPCS | Performed by: SURGERY

## 2023-01-12 PROCEDURE — 1123F ACP DISCUSS/DSCN MKR DOCD: CPT | Performed by: SURGERY

## 2023-01-12 PROCEDURE — G8427 DOCREV CUR MEDS BY ELIG CLIN: HCPCS | Performed by: SURGERY

## 2023-01-12 PROCEDURE — G8536 NO DOC ELDER MAL SCRN: HCPCS | Performed by: SURGERY

## 2023-01-12 PROCEDURE — G8510 SCR DEP NEG, NO PLAN REQD: HCPCS | Performed by: SURGERY

## 2023-01-12 PROCEDURE — 1101F PT FALLS ASSESS-DOCD LE1/YR: CPT | Performed by: SURGERY

## 2023-01-12 PROCEDURE — 99203 OFFICE O/P NEW LOW 30 MIN: CPT | Performed by: SURGERY

## 2023-01-12 RX ORDER — OLMESARTAN MEDOXOMIL 20 MG/1
TABLET ORAL
COMMUNITY
Start: 2023-01-05

## 2023-01-12 RX ORDER — LANCETS
EACH MISCELLANEOUS
COMMUNITY
Start: 2022-12-11

## 2023-01-12 RX ORDER — BLOOD SUGAR DIAGNOSTIC
STRIP MISCELLANEOUS
COMMUNITY
Start: 2022-12-11

## 2023-01-12 NOTE — PROGRESS NOTES
Surgery History and Physical    Subjective:      Daron Ho is a 80 y.o. male who presents for evaluation of right inguinal hernia. He had pain in his right groin. He sees Nuha Frey. When he does a lot of lifting, it will give him a problem. Tolerating diet. Having bowel function. He is active with yard work/house work. Past Medical History:   Diagnosis Date    Arthritis     Diabetes ([address]     Hypercholesteremia     Hypertension     Ill-defined condition     clausterphobia     Past Surgical History:   Procedure Laterality Date    HX CYST REMOVAL      HX KNEE ARTHROSCOPY Left     HX ORTHOPAEDIC      carpal tunnel surgery      Family History   Problem Relation Age of Onset    Cancer Mother     Cancer Father     Cancer Sister         brain tumor     Social History     Tobacco Use    Smoking status: Former     Packs/day: 1.00     Years: 1.00     Pack years: 1.00     Types: Cigarettes    Smokeless tobacco: Never   Substance Use Topics    Alcohol use: No      Prior to Admission medications    Medication Sig Start Date End Date Taking? Authorizing Provider   Accu-Chek SmartView Test Strip strip  12/11/22   Provider, Historical   Accu-Chek Fastclix Lancet Drum misc  12/11/22   Provider, Historical   olmesartan (BENICAR) 20 mg tablet  1/5/23   Provider, Historical   ondansetron (ZOFRAN ODT) 4 mg disintegrating tablet Take 1 Tab by mouth every eight (8) hours as needed for Nausea. 3/8/20   Danisha Edgar MD   ferrous sulfate (FEOSOL) 325 mg (65 mg iron) tablet Take 325 mg by mouth daily. Provider, Historical   meclizine (ANTIVERT) 25 mg tablet Take 25 mg by mouth three (3) times daily as needed for Dizziness. Provider, Historical   acetaminophen (TYLENOL) 500 mg tablet Take 500 mg by mouth every six (6) hours as needed for Pain. Provider, Historical   senna-docusate (PERICOLACE) 8.6-50 mg per tablet Take 1 Tab by mouth daily.  3/15/17   Preston Suarez NP   mupirocin calcium [facility]) 2 % nasal ointment by Both Nostrils route two (2) times a day. Pt to start 03/02/2017 BID X 5 days for Apparent Staph. 3/2/17   Provider, Historical   valsartan-hydroCHLOROthiazide (DIOVAN-HCT) 320-12.5 mg per tablet Take 1 Tab by mouth daily. Indications: hypertension    Provider, Historical   terazosin (HYTRIN) 10 mg capsule Take 10 mg by mouth nightly. Provider, Historical   PARoxetine (PAXIL) 20 mg tablet Take 20 mg by mouth daily. Provider, Historical   EPINEPHrine (EPIPEN) 0.3 mg/0.3 mL injection 0.3 mL by IntraMUSCular route once as needed for up to 1 dose. 5/19/16   Stormy Sullivan MD   atorvastatin (LIPITOR) 10 mg tablet Take 10 mg by mouth daily. Carmen Jose MD   metFORMIN (GLUCOPHAGE) 1,000 mg tablet Take 1,000 mg by mouth two (2) times daily (with meals). Carmen Jose MD   dorzolamide-timolol (COSOPT) 2-0.5 % ophthalmic solution Administer 1 Drop to left eye two (2) times a day. Carmen Jose MD   bimatoprost (LUMIGAN) 0.03 % ophthalmic drops Administer 1 Drop to left eye nightly. Carmen Jose MD      Allergies   Allergen Reactions    Amoxicillin Rash       Review of Systems:  A comprehensive review of systems was negative except for that written in the History of Present Illness. Objective:     Visit Vitals  BP 99/65 (BP 1 Location: Right upper arm, BP Patient Position: Sitting, BP Cuff Size: Small adult)   Pulse 67   Temp 97 °F (36.1 °C) (Temporal)   Resp 16   Ht 5' 10\" (1.778 m)   Wt 74.8 kg (164 lb 12.8 oz)   SpO2 98%   BMI 23.65 kg/m²         Physical Exam:  Physical Exam:  General:  Alert, cooperative, no distress, appears stated age. Eyes:  Conjunctivae/corneas clear. Ears:  Normal external ear canals both ears. Nose: Nares normal. Septum midline. Mouth/Throat: Lips, mucosa, and tongue normal. Teeth and gums normal.   Neck: Supple, symmetrical, trachea midline   Back:   Symmetric, no curvature. ROM normal.    Lungs:   Clear to auscultation bilaterally. Heart:  Regular rate and rhythm   Abdomen:   Soft, non-tender. Bowel sounds normal. Inguinal hernia not appreciated on exam   Extremities: Extremities normal, atraumatic, no cyanosis or edema. Skin: Skin color, texture, turgor normal. No rashes or lesions         Assessment:     80year old male with right inguinal pain    Plan:    Will obtain an abd US  Will call patient's wife with results    Lola Hensley MD

## 2023-01-12 NOTE — LETTER
1/12/2023    Patient: Ryan Humphries   YOB: 1940   Date of Visit: 1/12/2023     Oleg Hopkins MD  Via      Mel Osei MD  [facility] 1939  [address] [address]  Via Fax: [phone]    Dear MD Mel Nina MD,      Thank you for referring Mr. Coty Escoto to [address] for evaluation. My notes for this consultation are attached. If you have questions, please do not hesitate to call me. I look forward to following your patient along with you.       Sincerely,    Travis Seth MD

## 2023-01-12 NOTE — PROGRESS NOTES
Identified pt with two pt identifiers (name and ). Reviewed chart in preparation for visit and have obtained necessary documentation. Eric Rahman is a 80 y.o. male  Chief Complaint   Patient presents with    Possible Hernia     Seen at the request of lew Kendall groin hernia     Visit Vitals  BP 99/65 (BP 1 Location: Right upper arm, BP Patient Position: Sitting, BP Cuff Size: Small adult)   Pulse 67   Temp 97 °F (36.1 °C) (Temporal)   Resp 16   Ht 5' 10\" (1.778 m)   Wt 74.8 kg (164 lb 12.8 oz)   SpO2 98%   BMI 23.65 kg/m²       1. Have you been to the ER, urgent care clinic since your last visit? Hospitalized since your last visit? No    2. Have you seen or consulted any other health care providers outside of the [address] [place] [address] since your last visit? Include any pap smears or colon screening. No    US scheduled for  arrive 10am.   Nothing to eat after midnight.

## 2023-01-20 ENCOUNTER — HOSPITAL ENCOUNTER (OUTPATIENT)
Dept: ULTRASOUND IMAGING | Age: 83
Discharge: HOME | End: 2023-01-20
Attending: SURGERY
Payer: MEDICARE

## 2023-01-20 DIAGNOSIS — R10.31 RIGHT INGUINAL PAIN: ICD-10-CM

## 2023-01-20 PROCEDURE — 76882 US LMTD JT/FCL EVL NVASC XTR: CPT

## 2023-01-24 ENCOUNTER — TELEPHONE (OUTPATIENT)
Dept: SURGERY | Age: 83
End: 2023-01-24

## 2023-01-24 NOTE — TELEPHONE ENCOUNTER
Pt returned call. Pt states hernia is not bothering him much at this time and he will call back when ready. He wanted to let Dr. Josh Castrejon know that he want her to perform surgery when ready. I informed Pt if if was 3-4 months or more from now he would need to schedule appointment to discuss surgery.

## 2023-01-24 NOTE — TELEPHONE ENCOUNTER
Pt missed a call, calling back, didn't see any encounter, he did not receive a message    Call back   [phone]

## 2023-02-02 ENCOUNTER — TELEPHONE (OUTPATIENT)
Dept: SURGERY | Age: 83
End: 2023-02-02

## 2023-02-02 NOTE — TELEPHONE ENCOUNTER
Pt would like to discuss surgery but has questions about mesh. I informed Pt that mesh was used. Pt wanted to know alternatives. I informed Pt I would pass on message to Dr. Rai Jones. Pt also wanted to know how soon back to work 2-6 weeks. I informed Pt he would not be able to do heavy lifting for 6 weeks. Per Dr. Rai Jones, Pt to come in for office visit and bring someone with him due to hearing loss. Pt to schedule appt.

## 2023-02-07 ENCOUNTER — OFFICE VISIT (OUTPATIENT)
Dept: SURGERY | Age: 83
End: 2023-02-07
Payer: MEDICARE

## 2023-02-07 VITALS
OXYGEN SATURATION: 96 % | SYSTOLIC BLOOD PRESSURE: 134 MMHG | WEIGHT: 167.6 LBS | RESPIRATION RATE: 16 BRPM | TEMPERATURE: 96.9 F | HEIGHT: 70 IN | BODY MASS INDEX: 23.99 KG/M2 | HEART RATE: 70 BPM | DIASTOLIC BLOOD PRESSURE: 74 MMHG

## 2023-02-07 DIAGNOSIS — K40.90 RIGHT INGUINAL HERNIA: Primary | ICD-10-CM

## 2023-02-07 PROCEDURE — G8428 CUR MEDS NOT DOCUMENT: HCPCS | Performed by: SURGERY

## 2023-02-07 PROCEDURE — G8536 NO DOC ELDER MAL SCRN: HCPCS | Performed by: SURGERY

## 2023-02-07 PROCEDURE — 1123F ACP DISCUSS/DSCN MKR DOCD: CPT | Performed by: SURGERY

## 2023-02-07 PROCEDURE — 1101F PT FALLS ASSESS-DOCD LE1/YR: CPT | Performed by: SURGERY

## 2023-02-07 PROCEDURE — G8420 CALC BMI NORM PARAMETERS: HCPCS | Performed by: SURGERY

## 2023-02-07 PROCEDURE — G8510 SCR DEP NEG, NO PLAN REQD: HCPCS | Performed by: SURGERY

## 2023-02-07 PROCEDURE — 99214 OFFICE O/P EST MOD 30 MIN: CPT | Performed by: SURGERY

## 2023-02-07 NOTE — LETTER
2/7/2023    Patient: Nupur Mitchell   YOB: 1940   Date of Visit: 2/7/2023     Phoebe Jiang MD  [address]  [address] [address]  Via Fax: [phone]    Dear Phoebe Jiang MD,      Thank you for referring Mr. Kaleb Mckinney to [address] for evaluation. My notes for this consultation are attached. If you have questions, please do not hesitate to call me. I look forward to following your patient along with you.       Sincerely,    Rubina Colon MD

## 2023-02-07 NOTE — PROGRESS NOTES
Surgery History and Physical    Subjective:    23: Patient presents to discuss surgery. Patient reports intermittent discomfort. Last A1c is 6.1. Abd US: IMPRESSION  Moderate right inguinal hernia which appears to contain bowel. 23:  Alicja Banegas is a 80 y.o. male who presents for evaluation of right inguinal hernia. He had pain in his right groin. He sees [facility]. When he does a lot of lifting, it will give him a problem. Tolerating diet. Having bowel function. He is active with yard work/house work. Past Medical History:   Diagnosis Date    Arthritis     Diabetes ([address]     Hypercholesteremia     Hypertension     Ill-defined condition     clausterphobia     Past Surgical History:   Procedure Laterality Date    HX CYST REMOVAL      HX KNEE ARTHROSCOPY Left     HX ORTHOPAEDIC      carpal tunnel surgery      Family History   Problem Relation Age of Onset    Cancer Mother     Cancer Father     Cancer Sister         brain tumor     Social History     Tobacco Use    Smoking status: Former     Packs/day: 1.00     Years: 1.00     Pack years: 1.00     Types: Cigarettes     Quit date:      Years since quittin.1    Smokeless tobacco: Never   Substance Use Topics    Alcohol use: No      Prior to Admission medications    Medication Sig Start Date End Date Taking? Authorizing Provider   Accu-Chek SmartView Test Strip strip  22  Yes Provider, Historical   Accu-Chek Fastclix Lancet Drum misc  22  Yes Provider, Historical   olmesartan (BENICAR) 20 mg tablet  23  Yes Provider, Historical   ondansetron (ZOFRAN ODT) 4 mg disintegrating tablet Take 1 Tab by mouth every eight (8) hours as needed for Nausea. 3/8/20  Yes Danielle Edgar MD   acetaminophen (TYLENOL) 500 mg tablet Take 500 mg by mouth every six (6) hours as needed for Pain. Yes Provider, Historical   terazosin (HYTRIN) 10 mg capsule Take 10 mg by mouth nightly.    Yes Provider, Historical   PARoxetine (PAXIL) 20 mg tablet Take 20 mg by mouth daily. Yes Provider, Historical   EPINEPHrine (EPIPEN) 0.3 mg/0.3 mL injection 0.3 mL by IntraMUSCular route once as needed for up to 1 dose. 5/19/16  Yes Joe Rainey MD   atorvastatin (LIPITOR) 10 mg tablet Take 10 mg by mouth daily. Yes Carmen Jose MD   metFORMIN (GLUCOPHAGE) 1,000 mg tablet Take 1,000 mg by mouth two (2) times daily (with meals). Yes Rebeca, MD Carmen   dorzolamide-timoloL (COSOPT) 22.3-6.8 mg/mL ophthalmic solution Administer 1 Drop to left eye two (2) times a day. Yes Carmen Jose MD   bimatoprost (LUMIGAN) 0.03 % ophthalmic drops Administer 1 Drop to left eye nightly. Yes Rebeca, MD Carmen      Allergies   Allergen Reactions    Amoxicillin Rash       Review of Systems:  A comprehensive review of systems was negative except for that written in the History of Present Illness. Objective:     Visit Vitals  /74 (BP 1 Location: Right upper arm, BP Patient Position: Sitting, BP Cuff Size: Small adult)   Pulse 70   Temp 96.9 °F (36.1 °C) (Temporal)   Resp 16   Ht 5' 10\" (1.778 m)   Wt 76 kg (167 lb 9.6 oz)   SpO2 96%   BMI 24.05 kg/m²         Physical Exam:  Physical Exam:  General:  Alert, cooperative, no distress, appears stated age. Eyes:  Conjunctivae/corneas clear. Ears:  Normal external ear canals both ears. Nose: Nares normal. Septum midline. Mouth/Throat: Lips, mucosa, and tongue normal. Teeth and gums normal.   Neck: Supple, symmetrical, trachea midline   Back:   Symmetric, no curvature. ROM normal.    Lungs:   Clear to auscultation bilaterally. Heart:  Regular rate and rhythm   Abdomen:   Soft, non-tender. Bowel sounds normal. Reducible right inguinal hernia   Extremities: Extremities normal, atraumatic, no cyanosis or edema. Skin: Skin color, texture, turgor normal. No rashes or lesions         Assessment:     80year old male with right inguinal pain    Plan:   Abdifatah Pollen is having symptoms.   I have recommended to him that we proceed with surgery. I had an extensive discussion with him regarding the risks, benefits, and alternatives of proceeding with a Laparoscopic Right, Possible Bilateral Inguinal Hernia Repair with Mesh, Robot Assisted. Risks,benefits, and alternatives were discussed including the risk of anesthesia, bleeding, infection, including mesh infection, chronic orchialgia, neuralgia, other pain syndromes, testicular ischemia, conversion to open, injury to bowel, and recurrence were discussed. He is in agreement to proceed. All questions were answered. We will schedule him at his earliest convenience.       Temo Kramer MD

## 2023-02-07 NOTE — PROGRESS NOTES
Identified pt with two pt identifiers (name and ). Reviewed chart in preparation for visit and have obtained necessary documentation. Rosibel Izquierdo is a 80 y.o. male  Chief Complaint   Patient presents with    Advice Only     E/P Pt to discuss surgery. Visit Vitals  /74 (BP 1 Location: Right upper arm, BP Patient Position: Sitting, BP Cuff Size: Small adult)   Pulse 70   Temp 96.9 °F (36.1 °C) (Temporal)   Resp 16   Ht 5' 10\" (1.778 m)   Wt 76 kg (167 lb 9.6 oz)   SpO2 96%   BMI 24.05 kg/m²       1. Have you been to the ER, urgent care clinic since your last visit? Hospitalized since your last visit? No    2. Have you seen or consulted any other health care providers outside of the [address] [place] [address] since your last visit? Include any pap smears or colon screening.  No

## 2023-02-07 NOTE — H&P (VIEW-ONLY)
Surgery History and Physical    Subjective:    23: Patient presents to discuss surgery. Patient reports intermittent discomfort. Last A1c is 6.1. Abd US: IMPRESSION  Moderate right inguinal hernia which appears to contain bowel. 23:  Bg Ramos is a 80 y.o. male who presents for evaluation of right inguinal hernia. He had pain in his right groin. He sees [facility]. When he does a lot of lifting, it will give him a problem. Tolerating diet. Having bowel function. He is active with yard work/house work. Past Medical History:   Diagnosis Date    Arthritis     Diabetes ([address]     Hypercholesteremia     Hypertension     Ill-defined condition     clausterphobia     Past Surgical History:   Procedure Laterality Date    HX CYST REMOVAL      HX KNEE ARTHROSCOPY Left     HX ORTHOPAEDIC      carpal tunnel surgery      Family History   Problem Relation Age of Onset    Cancer Mother     Cancer Father     Cancer Sister         brain tumor     Social History     Tobacco Use    Smoking status: Former     Packs/day: 1.00     Years: 1.00     Pack years: 1.00     Types: Cigarettes     Quit date:      Years since quittin.1    Smokeless tobacco: Never   Substance Use Topics    Alcohol use: No      Prior to Admission medications    Medication Sig Start Date End Date Taking? Authorizing Provider   Accu-Chek SmartView Test Strip strip  22  Yes Provider, Historical   Accu-Chek Fastclix Lancet Drum misc  22  Yes Provider, Historical   olmesartan (BENICAR) 20 mg tablet  23  Yes Provider, Historical   ondansetron (ZOFRAN ODT) 4 mg disintegrating tablet Take 1 Tab by mouth every eight (8) hours as needed for Nausea. 3/8/20  Yes Marry Edgar MD   acetaminophen (TYLENOL) 500 mg tablet Take 500 mg by mouth every six (6) hours as needed for Pain. Yes Provider, Historical   terazosin (HYTRIN) 10 mg capsule Take 10 mg by mouth nightly.    Yes Provider, Historical   PARoxetine (PAXIL) 20 mg tablet Take 20 mg by mouth daily. Yes Provider, Historical   EPINEPHrine (EPIPEN) 0.3 mg/0.3 mL injection 0.3 mL by IntraMUSCular route once as needed for up to 1 dose. 5/19/16  Yes Sukumar Kumar MD   atorvastatin (LIPITOR) 10 mg tablet Take 10 mg by mouth daily. Yes Carmen Jose MD   metFORMIN (GLUCOPHAGE) 1,000 mg tablet Take 1,000 mg by mouth two (2) times daily (with meals). Yes Rebeca, MD Carmen   dorzolamide-timoloL (COSOPT) 22.3-6.8 mg/mL ophthalmic solution Administer 1 Drop to left eye two (2) times a day. Yes Carmen Jose MD   bimatoprost (LUMIGAN) 0.03 % ophthalmic drops Administer 1 Drop to left eye nightly. Yes Rebeca, MD Carmen      Allergies   Allergen Reactions    Amoxicillin Rash       Review of Systems:  A comprehensive review of systems was negative except for that written in the History of Present Illness. Objective:     Visit Vitals  /74 (BP 1 Location: Right upper arm, BP Patient Position: Sitting, BP Cuff Size: Small adult)   Pulse 70   Temp 96.9 °F (36.1 °C) (Temporal)   Resp 16   Ht 5' 10\" (1.778 m)   Wt 76 kg (167 lb 9.6 oz)   SpO2 96%   BMI 24.05 kg/m²         Physical Exam:  Physical Exam:  General:  Alert, cooperative, no distress, appears stated age. Eyes:  Conjunctivae/corneas clear. Ears:  Normal external ear canals both ears. Nose: Nares normal. Septum midline. Mouth/Throat: Lips, mucosa, and tongue normal. Teeth and gums normal.   Neck: Supple, symmetrical, trachea midline   Back:   Symmetric, no curvature. ROM normal.    Lungs:   Clear to auscultation bilaterally. Heart:  Regular rate and rhythm   Abdomen:   Soft, non-tender. Bowel sounds normal. Reducible right inguinal hernia   Extremities: Extremities normal, atraumatic, no cyanosis or edema. Skin: Skin color, texture, turgor normal. No rashes or lesions         Assessment:     80year old male with right inguinal pain    Plan:   Daniel Reyna is having symptoms.   I have recommended to him that we proceed with surgery. I had an extensive discussion with him regarding the risks, benefits, and alternatives of proceeding with a Laparoscopic Right, Possible Bilateral Inguinal Hernia Repair with Mesh, Robot Assisted. Risks,benefits, and alternatives were discussed including the risk of anesthesia, bleeding, infection, including mesh infection, chronic orchialgia, neuralgia, other pain syndromes, testicular ischemia, conversion to open, injury to bowel, and recurrence were discussed. He is in agreement to proceed. All questions were answered. We will schedule him at his earliest convenience.       Rubina Colon MD

## 2023-02-22 RX ORDER — NETARSUDIL AND LATANOPROST OPHTHALMIC SOLUTION, 0.02%/0.005% .2; .05 MG/ML; MG/ML
1 SOLUTION/ DROPS OPHTHALMIC; TOPICAL
COMMUNITY

## 2023-02-22 NOTE — PERIOP NOTES
[facility]  Preoperative Instructions        Surgery Date 02/27/23          Time of Arrival 02/24/23 between 2-5pm    1. On the day of your surgery, please report to the Surgical Services Registration Desk and sign in at your designated time. The Surgery Center is located to the right of the Emergency Room. 2. You must have someone with you to drive you home. You should not drive a car for 24 hours following surgery. Please make arrangements for a friend or family member to stay with you for the first 24 hours after your surgery. 3. Do not have anything to eat or drink (including water, gum, mints, coffee, juice) after midnight ?02/26/23? [facility] ? This may not apply to medications prescribed by your physician. ?(Please note below the special instructions with medications to take the morning of your procedure.)    4. We recommend you do not drink any alcoholic beverages for 24 hours before and after your surgery. 5. Contact your surgeons office for instructions on the following medications: non-steroidal anti-inflammatory drugs (i.e. Advil, Aleve), vitamins, and supplements. (Some surgeons will want you to stop these medications prior to surgery and others may allow you to take them)  **If you are currently taking Plavix, Coumadin, Aspirin and/or other blood-thinning agents, contact your surgeon for instructions. ** Your surgeon will partner with the physician prescribing these medications to determine if it is safe to stop or if you need to continue taking. Please do not stop taking these medications without instructions from your surgeon    6. Wear comfortable clothes. Wear glasses instead of contacts. Do not bring any money or jewelry. Please bring picture ID, insurance card, and any prearranged co-payment or hospital payment. Do not wear make-up, particularly mascara the morning of your surgery. Do not wear nail polish, particularly if you are having foot /hand surgery. Wear your hair loose or down, no ponytails, buns, sarah pins or clips. All body piercings must be removed. Please shower with antibacterial soap for three consecutive days before and on the morning of surgery, but do not apply any lotions, powders or deodorants after the shower on the day of surgery. Please use a fresh towels after each shower. Please sleep in clean clothes and change bed linens the night before surgery. Please do not shave for 48 hours prior to surgery. Shaving of the face is acceptable. 7. You should understand that if you do not follow these instructions your surgery may be cancelled. If your physical condition changes (I.e. fever, cold or flu) please contact your surgeon as soon as possible. 8. It is important that you be on time. If a situation occurs where you may be late, please call [phone] (OR Holding Area). 9. If you have any questions and or problems, please call [phone] (Pre-admission Testing). 10. Your surgery time may be subject to change. You will receive a phone call the evening prior if your time changes. 11.  If having outpatient surgery, you must have someone to drive you here, stay with you during the duration of your stay, and to drive you home at time of discharge. TAKE ALL MEDICATIONS DAY OF SURGERY EXCEPT: metformin      I understand a pre-operative phone call will be made to verify my surgery time. In the event that I am not available, I give permission for a message to be left on my answering service and/or with another person?   Yes [phone]         ___________________      __________   _________    (Signature of Patient)             (Witness)                (Date and Time)

## 2023-02-27 ENCOUNTER — ANESTHESIA EVENT (OUTPATIENT)
Dept: SURGERY | Age: 83
End: 2023-02-27
Payer: MEDICARE

## 2023-02-27 ENCOUNTER — HOSPITAL ENCOUNTER (OUTPATIENT)
Age: 83
Discharge: HOME | End: 2023-03-01
Attending: SURGERY | Admitting: SURGERY
Payer: MEDICARE

## 2023-02-27 ENCOUNTER — ANESTHESIA (OUTPATIENT)
Dept: SURGERY | Age: 83
End: 2023-02-27
Payer: MEDICARE

## 2023-02-27 DIAGNOSIS — K40.20 BILATERAL INGUINAL HERNIA WITHOUT OBSTRUCTION OR GANGRENE, RECURRENCE NOT SPECIFIED: Primary | ICD-10-CM

## 2023-02-27 LAB
GLUCOSE BLD STRIP.AUTO-MCNC: 150 MG/DL (ref 65–117)
GLUCOSE BLD STRIP.AUTO-MCNC: 95 MG/DL (ref 65–117)
SERVICE CMNT-IMP: ABNORMAL
SERVICE CMNT-IMP: NORMAL

## 2023-02-27 PROCEDURE — 74011000250 HC RX REV CODE- 250: Performed by: NURSE ANESTHETIST, CERTIFIED REGISTERED

## 2023-02-27 PROCEDURE — 77030008608 HC TRCR ENDOSC SMTH AMR -B: Performed by: SURGERY

## 2023-02-27 PROCEDURE — 76060000034 HC ANESTHESIA 1.5 TO 2 HR: Performed by: SURGERY

## 2023-02-27 PROCEDURE — 77030035277 HC OBTRTR BLDELSS DISP INTU -B: Performed by: SURGERY

## 2023-02-27 PROCEDURE — 74011250637 HC RX REV CODE- 250/637: Performed by: SURGERY

## 2023-02-27 PROCEDURE — 77030002933 HC SUT MCRYL J&J -A: Performed by: SURGERY

## 2023-02-27 PROCEDURE — 74011250636 HC RX REV CODE- 250/636: Performed by: SURGERY

## 2023-02-27 PROCEDURE — 2709999900 HC NON-CHARGEABLE SUPPLY: Performed by: SURGERY

## 2023-02-27 PROCEDURE — 77030026438 HC STYL ET INTUB CARD -A: Performed by: NURSE ANESTHETIST, CERTIFIED REGISTERED

## 2023-02-27 PROCEDURE — 51798 US URINE CAPACITY MEASURE: CPT

## 2023-02-27 PROCEDURE — 77030018673: Performed by: SURGERY

## 2023-02-27 PROCEDURE — 76010000875 HC OR TIME 1.5 TO 2HR INTENSV - TIER 2: Performed by: SURGERY

## 2023-02-27 PROCEDURE — S2900 ROBOTIC SURGICAL SYSTEM: HCPCS | Performed by: SURGERY

## 2023-02-27 PROCEDURE — 77030038613 HC SUT PDS STRATA SPIRL J&J -B: Performed by: SURGERY

## 2023-02-27 PROCEDURE — 74011250636 HC RX REV CODE- 250/636: Performed by: ANESTHESIOLOGY

## 2023-02-27 PROCEDURE — 49650 LAP ING HERNIA REPAIR INIT: CPT | Performed by: SURGERY

## 2023-02-27 PROCEDURE — 77030008603 HC TRCR ENDOSC EPATH J&J -C: Performed by: SURGERY

## 2023-02-27 PROCEDURE — 74011000250 HC RX REV CODE- 250: Performed by: SURGERY

## 2023-02-27 PROCEDURE — 77030002901 HC SUT DEV MCLOSE MPSA - B: Performed by: SURGERY

## 2023-02-27 PROCEDURE — 74011250636 HC RX REV CODE- 250/636: Performed by: NURSE ANESTHETIST, CERTIFIED REGISTERED

## 2023-02-27 PROCEDURE — 77030010507 HC ADH SKN DERMBND J&J -B: Performed by: SURGERY

## 2023-02-27 PROCEDURE — 74011000250 HC RX REV CODE- 250: Performed by: ANESTHESIOLOGY

## 2023-02-27 PROCEDURE — 77030013079 HC BLNKT BAIR HGGR 3M -A: Performed by: NURSE ANESTHETIST, CERTIFIED REGISTERED

## 2023-02-27 PROCEDURE — 77030020703 HC SEAL CANN DISP INTU -B: Performed by: SURGERY

## 2023-02-27 PROCEDURE — 77030016151 HC PROTCTR LNS DFOG COVD -B: Performed by: SURGERY

## 2023-02-27 PROCEDURE — C1781 MESH (IMPLANTABLE): HCPCS | Performed by: SURGERY

## 2023-02-27 PROCEDURE — 74011250637 HC RX REV CODE- 250/637: Performed by: ANESTHESIOLOGY

## 2023-02-27 PROCEDURE — 77030008684 HC TU ET CUF COVD -B: Performed by: NURSE ANESTHETIST, CERTIFIED REGISTERED

## 2023-02-27 PROCEDURE — 82962 GLUCOSE BLOOD TEST: CPT

## 2023-02-27 PROCEDURE — 76210000006 HC OR PH I REC 0.5 TO 1 HR: Performed by: SURGERY

## 2023-02-27 PROCEDURE — 77030031139 HC SUT VCRL2 J&J -A: Performed by: SURGERY

## 2023-02-27 PROCEDURE — 77030002966 HC SUT PDS J&J -A: Performed by: SURGERY

## 2023-02-27 DEVICE — BARD SOFT MESH, 6" X 6" (15 CM X 15 CM)
Type: IMPLANTABLE DEVICE | Site: INGUINAL | Status: FUNCTIONAL
Brand: BARD

## 2023-02-27 RX ORDER — DIPHENHYDRAMINE HCL 25 MG
25 CAPSULE ORAL
Status: ACTIVE | OUTPATIENT
Start: 2023-02-27 | End: 2023-02-28

## 2023-02-27 RX ORDER — MIDAZOLAM HYDROCHLORIDE 1 MG/ML
1 INJECTION, SOLUTION INTRAMUSCULAR; INTRAVENOUS AS NEEDED
Status: DISCONTINUED | OUTPATIENT
Start: 2023-02-27 | End: 2023-02-27 | Stop reason: HOSPADM

## 2023-02-27 RX ORDER — SODIUM CHLORIDE 0.9 % (FLUSH) 0.9 %
5-40 SYRINGE (ML) INJECTION AS NEEDED
Status: DISCONTINUED | OUTPATIENT
Start: 2023-02-27 | End: 2023-02-27 | Stop reason: HOSPADM

## 2023-02-27 RX ORDER — PAROXETINE HYDROCHLORIDE 20 MG/1
20 TABLET, FILM COATED ORAL DAILY
Status: DISCONTINUED | OUTPATIENT
Start: 2023-02-27 | End: 2023-03-01 | Stop reason: HOSPADM

## 2023-02-27 RX ORDER — SODIUM CHLORIDE 0.9 % (FLUSH) 0.9 %
5-40 SYRINGE (ML) INJECTION EVERY 8 HOURS
Status: DISCONTINUED | OUTPATIENT
Start: 2023-02-27 | End: 2023-02-27 | Stop reason: HOSPADM

## 2023-02-27 RX ORDER — AMOXICILLIN 250 MG
1 CAPSULE ORAL DAILY
Status: DISCONTINUED | OUTPATIENT
Start: 2023-02-27 | End: 2023-03-01 | Stop reason: HOSPADM

## 2023-02-27 RX ORDER — EPHEDRINE SULFATE/0.9% NACL/PF 50 MG/5 ML
SYRINGE (ML) INTRAVENOUS AS NEEDED
Status: DISCONTINUED | OUTPATIENT
Start: 2023-02-27 | End: 2023-02-27 | Stop reason: HOSPADM

## 2023-02-27 RX ORDER — NALOXONE HYDROCHLORIDE 0.4 MG/ML
0.4 INJECTION, SOLUTION INTRAMUSCULAR; INTRAVENOUS; SUBCUTANEOUS AS NEEDED
Status: DISCONTINUED | OUTPATIENT
Start: 2023-02-27 | End: 2023-03-01 | Stop reason: HOSPADM

## 2023-02-27 RX ORDER — MORPHINE SULFATE 4 MG/ML
2 INJECTION INTRAVENOUS
Status: DISCONTINUED | OUTPATIENT
Start: 2023-02-27 | End: 2023-02-27 | Stop reason: HOSPADM

## 2023-02-27 RX ORDER — ROPIVACAINE HYDROCHLORIDE 5 MG/ML
30 INJECTION, SOLUTION EPIDURAL; INFILTRATION; PERINEURAL AS NEEDED
Status: DISCONTINUED | OUTPATIENT
Start: 2023-02-27 | End: 2023-02-27 | Stop reason: HOSPADM

## 2023-02-27 RX ORDER — ONDANSETRON 2 MG/ML
4 INJECTION INTRAMUSCULAR; INTRAVENOUS
Status: DISCONTINUED | OUTPATIENT
Start: 2023-02-27 | End: 2023-03-01 | Stop reason: HOSPADM

## 2023-02-27 RX ORDER — SODIUM CHLORIDE, SODIUM LACTATE, POTASSIUM CHLORIDE, CALCIUM CHLORIDE 600; 310; 30; 20 MG/100ML; MG/100ML; MG/100ML; MG/100ML
100 INJECTION, SOLUTION INTRAVENOUS CONTINUOUS
Status: DISCONTINUED | OUTPATIENT
Start: 2023-02-27 | End: 2023-02-27 | Stop reason: HOSPADM

## 2023-02-27 RX ORDER — OXYCODONE HYDROCHLORIDE 5 MG/1
5 TABLET ORAL
Status: DISCONTINUED | OUTPATIENT
Start: 2023-02-27 | End: 2023-03-01 | Stop reason: HOSPADM

## 2023-02-27 RX ORDER — HYDROMORPHONE HYDROCHLORIDE 1 MG/ML
0.5 INJECTION, SOLUTION INTRAMUSCULAR; INTRAVENOUS; SUBCUTANEOUS
Status: COMPLETED | OUTPATIENT
Start: 2023-02-27 | End: 2023-02-27

## 2023-02-27 RX ORDER — ONDANSETRON 2 MG/ML
INJECTION INTRAMUSCULAR; INTRAVENOUS AS NEEDED
Status: DISCONTINUED | OUTPATIENT
Start: 2023-02-27 | End: 2023-02-27 | Stop reason: HOSPADM

## 2023-02-27 RX ORDER — SODIUM CHLORIDE 0.9 % (FLUSH) 0.9 %
5-40 SYRINGE (ML) INJECTION EVERY 8 HOURS
Status: DISCONTINUED | OUTPATIENT
Start: 2023-02-27 | End: 2023-03-01 | Stop reason: HOSPADM

## 2023-02-27 RX ORDER — PROPOFOL 10 MG/ML
INJECTION, EMULSION INTRAVENOUS AS NEEDED
Status: DISCONTINUED | OUTPATIENT
Start: 2023-02-27 | End: 2023-02-27 | Stop reason: HOSPADM

## 2023-02-27 RX ORDER — ENOXAPARIN SODIUM 100 MG/ML
40 INJECTION SUBCUTANEOUS EVERY 24 HOURS
Status: DISCONTINUED | OUTPATIENT
Start: 2023-02-28 | End: 2023-03-01 | Stop reason: HOSPADM

## 2023-02-27 RX ORDER — ATORVASTATIN CALCIUM 10 MG/1
10 TABLET, FILM COATED ORAL DAILY
Status: DISCONTINUED | OUTPATIENT
Start: 2023-02-27 | End: 2023-03-01 | Stop reason: HOSPADM

## 2023-02-27 RX ORDER — SODIUM CHLORIDE 9 MG/ML
25 INJECTION, SOLUTION INTRAVENOUS CONTINUOUS
Status: DISCONTINUED | OUTPATIENT
Start: 2023-02-27 | End: 2023-02-27 | Stop reason: HOSPADM

## 2023-02-27 RX ORDER — ONDANSETRON 2 MG/ML
4 INJECTION INTRAMUSCULAR; INTRAVENOUS AS NEEDED
Status: DISCONTINUED | OUTPATIENT
Start: 2023-02-27 | End: 2023-02-27 | Stop reason: HOSPADM

## 2023-02-27 RX ORDER — ACETAMINOPHEN 325 MG/1
650 TABLET ORAL EVERY 6 HOURS
Status: DISCONTINUED | OUTPATIENT
Start: 2023-02-27 | End: 2023-03-01 | Stop reason: HOSPADM

## 2023-02-27 RX ORDER — SODIUM CHLORIDE, SODIUM LACTATE, POTASSIUM CHLORIDE, CALCIUM CHLORIDE 600; 310; 30; 20 MG/100ML; MG/100ML; MG/100ML; MG/100ML
50 INJECTION, SOLUTION INTRAVENOUS CONTINUOUS
Status: DISCONTINUED | OUTPATIENT
Start: 2023-02-27 | End: 2023-03-01 | Stop reason: HOSPADM

## 2023-02-27 RX ORDER — LIDOCAINE HYDROCHLORIDE 10 MG/ML
0.1 INJECTION, SOLUTION EPIDURAL; INFILTRATION; INTRACAUDAL; PERINEURAL AS NEEDED
Status: DISCONTINUED | OUTPATIENT
Start: 2023-02-27 | End: 2023-02-27 | Stop reason: HOSPADM

## 2023-02-27 RX ORDER — LORAZEPAM 2 MG/ML
0.5 INJECTION INTRAMUSCULAR ONCE
Status: COMPLETED | OUTPATIENT
Start: 2023-02-27 | End: 2023-02-27

## 2023-02-27 RX ORDER — DORZOLAMIDE HYDROCHLORIDE AND TIMOLOL MALEATE 20; 5 MG/ML; MG/ML
1 SOLUTION/ DROPS OPHTHALMIC 2 TIMES DAILY
Status: DISCONTINUED | OUTPATIENT
Start: 2023-02-27 | End: 2023-03-01 | Stop reason: HOSPADM

## 2023-02-27 RX ORDER — MIDAZOLAM HYDROCHLORIDE 1 MG/ML
0.5 INJECTION, SOLUTION INTRAMUSCULAR; INTRAVENOUS
Status: DISCONTINUED | OUTPATIENT
Start: 2023-02-27 | End: 2023-02-27 | Stop reason: HOSPADM

## 2023-02-27 RX ORDER — LOSARTAN POTASSIUM 50 MG/1
50 TABLET ORAL DAILY
Status: DISCONTINUED | OUTPATIENT
Start: 2023-02-28 | End: 2023-03-01 | Stop reason: HOSPADM

## 2023-02-27 RX ORDER — LIDOCAINE HYDROCHLORIDE 20 MG/ML
INJECTION, SOLUTION EPIDURAL; INFILTRATION; INTRACAUDAL; PERINEURAL AS NEEDED
Status: DISCONTINUED | OUTPATIENT
Start: 2023-02-27 | End: 2023-02-27 | Stop reason: HOSPADM

## 2023-02-27 RX ORDER — DOXAZOSIN 2 MG/1
8 TABLET ORAL
Status: DISCONTINUED | OUTPATIENT
Start: 2023-02-27 | End: 2023-03-01 | Stop reason: HOSPADM

## 2023-02-27 RX ORDER — ACETAMINOPHEN 325 MG/1
650 TABLET ORAL ONCE
Status: COMPLETED | OUTPATIENT
Start: 2023-02-27 | End: 2023-02-27

## 2023-02-27 RX ORDER — SUCCINYLCHOLINE CHLORIDE 20 MG/ML
INJECTION INTRAMUSCULAR; INTRAVENOUS AS NEEDED
Status: DISCONTINUED | OUTPATIENT
Start: 2023-02-27 | End: 2023-02-27 | Stop reason: HOSPADM

## 2023-02-27 RX ORDER — KETOROLAC TROMETHAMINE 30 MG/ML
INJECTION, SOLUTION INTRAMUSCULAR; INTRAVENOUS AS NEEDED
Status: DISCONTINUED | OUTPATIENT
Start: 2023-02-27 | End: 2023-02-27 | Stop reason: HOSPADM

## 2023-02-27 RX ORDER — BUPIVACAINE HYDROCHLORIDE AND EPINEPHRINE 5; 5 MG/ML; UG/ML
INJECTION, SOLUTION EPIDURAL; INTRACAUDAL; PERINEURAL AS NEEDED
Status: DISCONTINUED | OUTPATIENT
Start: 2023-02-27 | End: 2023-02-27 | Stop reason: HOSPADM

## 2023-02-27 RX ORDER — ROCURONIUM BROMIDE 10 MG/ML
INJECTION, SOLUTION INTRAVENOUS AS NEEDED
Status: DISCONTINUED | OUTPATIENT
Start: 2023-02-27 | End: 2023-02-27 | Stop reason: HOSPADM

## 2023-02-27 RX ORDER — SODIUM CHLORIDE 0.9 % (FLUSH) 0.9 %
5-40 SYRINGE (ML) INJECTION AS NEEDED
Status: DISCONTINUED | OUTPATIENT
Start: 2023-02-27 | End: 2023-03-01 | Stop reason: HOSPADM

## 2023-02-27 RX ORDER — LORAZEPAM 2 MG/ML
INJECTION INTRAMUSCULAR
Status: DISPENSED
Start: 2023-02-27 | End: 2023-02-27

## 2023-02-27 RX ORDER — HYDROMORPHONE HYDROCHLORIDE 1 MG/ML
0.5 INJECTION, SOLUTION INTRAMUSCULAR; INTRAVENOUS; SUBCUTANEOUS
Status: DISCONTINUED | OUTPATIENT
Start: 2023-02-27 | End: 2023-03-01 | Stop reason: HOSPADM

## 2023-02-27 RX ORDER — SODIUM CHLORIDE, SODIUM LACTATE, POTASSIUM CHLORIDE, CALCIUM CHLORIDE 600; 310; 30; 20 MG/100ML; MG/100ML; MG/100ML; MG/100ML
25 INJECTION, SOLUTION INTRAVENOUS CONTINUOUS
Status: DISCONTINUED | OUTPATIENT
Start: 2023-02-27 | End: 2023-02-27 | Stop reason: HOSPADM

## 2023-02-27 RX ORDER — DIPHENHYDRAMINE HYDROCHLORIDE 50 MG/ML
12.5 INJECTION, SOLUTION INTRAMUSCULAR; INTRAVENOUS AS NEEDED
Status: DISCONTINUED | OUTPATIENT
Start: 2023-02-27 | End: 2023-02-27 | Stop reason: HOSPADM

## 2023-02-27 RX ORDER — FENTANYL CITRATE 50 UG/ML
INJECTION, SOLUTION INTRAMUSCULAR; INTRAVENOUS AS NEEDED
Status: DISCONTINUED | OUTPATIENT
Start: 2023-02-27 | End: 2023-02-27 | Stop reason: HOSPADM

## 2023-02-27 RX ORDER — POLYETHYLENE GLYCOL 3350 17 G/17G
17 POWDER, FOR SOLUTION ORAL DAILY
Status: DISCONTINUED | OUTPATIENT
Start: 2023-02-27 | End: 2023-03-01 | Stop reason: HOSPADM

## 2023-02-27 RX ORDER — FENTANYL CITRATE 50 UG/ML
50 INJECTION, SOLUTION INTRAMUSCULAR; INTRAVENOUS AS NEEDED
Status: DISCONTINUED | OUTPATIENT
Start: 2023-02-27 | End: 2023-02-27 | Stop reason: HOSPADM

## 2023-02-27 RX ORDER — LATANOPROST 50 UG/ML
1 SOLUTION/ DROPS OPHTHALMIC EVERY EVENING
Status: DISCONTINUED | OUTPATIENT
Start: 2023-02-27 | End: 2023-03-01 | Stop reason: HOSPADM

## 2023-02-27 RX ORDER — OXYCODONE HYDROCHLORIDE 5 MG/1
TABLET ORAL
Status: DISPENSED
Start: 2023-02-27 | End: 2023-02-27

## 2023-02-27 RX ORDER — FENTANYL CITRATE 50 UG/ML
25 INJECTION, SOLUTION INTRAMUSCULAR; INTRAVENOUS
Status: COMPLETED | OUTPATIENT
Start: 2023-02-27 | End: 2023-02-27

## 2023-02-27 RX ADMIN — FENTANYL CITRATE 25 MCG: 0.05 INJECTION, SOLUTION INTRAMUSCULAR; INTRAVENOUS at 09:53

## 2023-02-27 RX ADMIN — SUGAMMADEX 200 MG: 100 INJECTION, SOLUTION INTRAVENOUS at 09:17

## 2023-02-27 RX ADMIN — ACETAMINOPHEN 325MG 650 MG: 325 TABLET ORAL at 18:15

## 2023-02-27 RX ADMIN — SODIUM CHLORIDE, PRESERVATIVE FREE 10 ML: 5 INJECTION INTRAVENOUS at 10:59

## 2023-02-27 RX ADMIN — FENTANYL CITRATE 25 MCG: 0.05 INJECTION, SOLUTION INTRAMUSCULAR; INTRAVENOUS at 09:48

## 2023-02-27 RX ADMIN — SODIUM CHLORIDE, POTASSIUM CHLORIDE, SODIUM LACTATE AND CALCIUM CHLORIDE 100 ML/HR: 600; 310; 30; 20 INJECTION, SOLUTION INTRAVENOUS at 07:19

## 2023-02-27 RX ADMIN — SODIUM CHLORIDE, PRESERVATIVE FREE 10 ML: 5 INJECTION INTRAVENOUS at 21:17

## 2023-02-27 RX ADMIN — PROPOFOL 70 MG: 10 INJECTION, EMULSION INTRAVENOUS at 07:41

## 2023-02-27 RX ADMIN — ROCURONIUM BROMIDE 5 MG: 10 INJECTION INTRAVENOUS at 08:24

## 2023-02-27 RX ADMIN — ONDANSETRON HYDROCHLORIDE 4 MG: 2 INJECTION, SOLUTION INTRAMUSCULAR; INTRAVENOUS at 09:15

## 2023-02-27 RX ADMIN — ROCURONIUM BROMIDE 5 MG: 10 INJECTION INTRAVENOUS at 08:39

## 2023-02-27 RX ADMIN — Medication 10 MG: at 07:57

## 2023-02-27 RX ADMIN — KETOROLAC TROMETHAMINE 15 MG: 30 INJECTION, SOLUTION INTRAMUSCULAR; INTRAVENOUS at 09:15

## 2023-02-27 RX ADMIN — LORAZEPAM 0.5 MG: 2 INJECTION INTRAMUSCULAR; INTRAVENOUS at 10:10

## 2023-02-27 RX ADMIN — LIDOCAINE HYDROCHLORIDE 60 MG: 20 INJECTION, SOLUTION EPIDURAL; INFILTRATION; INTRACAUDAL; PERINEURAL at 07:41

## 2023-02-27 RX ADMIN — HYDROMORPHONE HYDROCHLORIDE 0.5 MG: 1 INJECTION, SOLUTION INTRAMUSCULAR; INTRAVENOUS; SUBCUTANEOUS at 10:22

## 2023-02-27 RX ADMIN — FENTANYL CITRATE 25 MCG: 0.05 INJECTION, SOLUTION INTRAMUSCULAR; INTRAVENOUS at 09:43

## 2023-02-27 RX ADMIN — FENTANYL CITRATE 25 MCG: 50 INJECTION, SOLUTION INTRAMUSCULAR; INTRAVENOUS at 08:24

## 2023-02-27 RX ADMIN — VANCOMYCIN HYDROCHLORIDE 1000 MG: 1 INJECTION, POWDER, LYOPHILIZED, FOR SOLUTION INTRAVENOUS at 07:57

## 2023-02-27 RX ADMIN — PROPOFOL 25 MG: 10 INJECTION, EMULSION INTRAVENOUS at 08:24

## 2023-02-27 RX ADMIN — PROPOFOL 25 MG: 10 INJECTION, EMULSION INTRAVENOUS at 08:39

## 2023-02-27 RX ADMIN — OXYCODONE 5 MG: 5 TABLET ORAL at 14:10

## 2023-02-27 RX ADMIN — Medication 1 AMPULE: at 21:16

## 2023-02-27 RX ADMIN — SODIUM CHLORIDE, POTASSIUM CHLORIDE, SODIUM LACTATE AND CALCIUM CHLORIDE 50 ML/HR: 600; 310; 30; 20 INJECTION, SOLUTION INTRAVENOUS at 21:27

## 2023-02-27 RX ADMIN — FENTANYL CITRATE 50 MCG: 50 INJECTION, SOLUTION INTRAMUSCULAR; INTRAVENOUS at 08:02

## 2023-02-27 RX ADMIN — HYDROMORPHONE HYDROCHLORIDE 0.5 MG: 1 INJECTION, SOLUTION INTRAMUSCULAR; INTRAVENOUS; SUBCUTANEOUS at 09:50

## 2023-02-27 RX ADMIN — SUCCINYLCHOLINE CHLORIDE 180 MG: 20 INJECTION, SOLUTION INTRAMUSCULAR; INTRAVENOUS at 07:41

## 2023-02-27 RX ADMIN — Medication 3 AMPULE: at 07:22

## 2023-02-27 RX ADMIN — ROCURONIUM BROMIDE 30 MG: 10 INJECTION INTRAVENOUS at 07:52

## 2023-02-27 RX ADMIN — DOXAZOSIN 8 MG: 2 TABLET ORAL at 21:16

## 2023-02-27 RX ADMIN — SODIUM CHLORIDE, POTASSIUM CHLORIDE, SODIUM LACTATE AND CALCIUM CHLORIDE 50 ML/HR: 600; 310; 30; 20 INJECTION, SOLUTION INTRAVENOUS at 10:15

## 2023-02-27 RX ADMIN — VANCOMYCIN HYDROCHLORIDE 1000 MG: 1 INJECTION, POWDER, LYOPHILIZED, FOR SOLUTION INTRAVENOUS at 07:20

## 2023-02-27 RX ADMIN — OXYCODONE 5 MG: 5 TABLET ORAL at 19:20

## 2023-02-27 RX ADMIN — FENTANYL CITRATE 25 MCG: 50 INJECTION, SOLUTION INTRAMUSCULAR; INTRAVENOUS at 08:39

## 2023-02-27 RX ADMIN — ACETAMINOPHEN 325MG 650 MG: 325 TABLET ORAL at 07:10

## 2023-02-27 RX ADMIN — FENTANYL CITRATE 25 MCG: 0.05 INJECTION, SOLUTION INTRAMUSCULAR; INTRAVENOUS at 09:38

## 2023-02-27 NOTE — PROGRESS NOTES
Orders received and chart reviewed. Pt is currently in PACU s/p hernia repair. OT will follow up tomorrow per guidelines.

## 2023-02-27 NOTE — PROGRESS NOTES
Orders received and chart reviewed. Pt is currently in PACU s/p hernia repair. PT will follow up tomorrow per guidelines.

## 2023-02-27 NOTE — DISCHARGE INSTRUCTIONS
Dr. Mays Ast Discharge Instructions for:  Bg Ramos    MRN: [id number] : 1940    Admitted: 2023  Discharged: 2023       What to do at [address]: Resume your usual diet    Recommended activity: Lift less than 10 lbs for 4 weeks. Follow-up with Dr. Devonda Pallas in 2-3 weeks. Call [phone] for an appointment. Inguinal Hernia Repair: What to Expect at [address] are likely to have pain for the next few days. You may also feel like you have the flu, and you may have a low fever and feel tired and nauseated. This is common. You should feel better after a few days and will probably feel much better in 7 days. For several weeks you may feel twinges or pulling in the hernia repair when you move. You may have some bruising on the scrotum and along the penis. This is normal.    Men will need to wear a jockstrap or briefs, not boxers, for scrotal support for several days after a groin (inguinal) hernia repair. [email or web address] bicycle shorts may provide good support. This care sheet gives you a general idea about how long it will take for you to recover. But each person recovers at a different pace. Follow the steps below to get better as quickly as possible. How can you care for yourself at home? Activity  Rest when you feel tired. Getting enough sleep will help you recover. You can try to sleep with your head up in a recliner chair if that is more comfortable. Try to walk each day. Start by walking a little more than you did the day before. Bit by bit, increase the amount you walk. Walking boosts blood flow and helps prevent pneumonia and constipation. Put ice or a cold pack on the area of your hernia repair for 10 to 15 minutes at a time. Try to do this every 1 to 2 hours for the first 24 hours (when you are awake) or until the swelling goes down. Put a thin cloth between the ice and your skin.   Avoid strenuous activities, such as biking, jogging, weight lifting, or aerobic exercise, until your doctor says it is okay. Avoid lifting anything that would make you strain. This may include heavy grocery bags and milk containers, a heavy briefcase or backpack, cat litter or dog food bags, a child, or a vacuum . You may drive when you are no longer taking pain medicine and can quickly move your foot from the gas pedal to the brake. You must also be able to sit comfortably for a long period of time. Most people are able to return to work within 1 to 2 weeks after surgery. You may shower. Pat the cut (incision) dry. Do not take a bath for 2 weeks. You can have sex again when it feels comfortable to do so. Diet  You can eat your normal diet. If your stomach is upset, try bland, low-fat foods like plain rice, broiled chicken, toast, and yogurt. Drink plenty of fluids (unless your doctor tells you not to). You may notice that your bowel movements are not regular right after your surgery. This is common. Avoid constipation and straining with bowel movements. You may want to take a fiber supplement every day. If you have not had a bowel movement after a couple of days, ask your doctor about taking a mild laxative. Medicines  Take pain medicines exactly as directed. If the doctor gave you a prescription medicine for pain, take it as prescribed. If you are not taking a prescription pain medicine, take an over-the-counter medicine such as acetaminophen (Tylenol), ibuprofen (Advil, Motrin), or naproxen (Aleve). Read and follow all instructions on the label. Do not take two or more pain medicines at the same time unless the doctor told you to. Many pain medicines have acetaminophen, which is Tylenol. Too much acetaminophen (Tylenol) can be harmful. If your doctor prescribed antibiotics, take them as directed. Do not stop taking them just because you feel better. You need to take the full course of antibiotics.   If you think your pain medicine is making you sick to your stomach: Take your medicine after meals (unless your doctor has told you not to). Ask your doctor for a different pain medicine. Incision care  If you have strips of tape on the cut (incision) the doctor made, leave the tape on for a week or until it falls off. If you have staples closing the cut, you will need to visit your doctor in 1 to 2 weeks to have them removed. Wash the area daily with warm, soapy water and pat it dry. Follow-up care is a key part of your treatment and safety. Be sure to make and go to all appointments, and call your doctor if you are having problems. Its also a good idea to know your test results and keep a list of the medicines you take. When should you call for help? Call 911 anytime you think you may need emergency care. For example, call if:  You pass out (lose consciousness). You have sudden chest pain and shortness of breath, or you cough up blood. You have severe pain in your belly. Call your doctor now or seek immediate medical care if:  You are sick to your stomach and cannot keep fluids down. You have signs of a blood clot, such as:  Pain in your calf, back of the knee, thigh, or groin. Redness and swelling in your leg or groin. You have trouble passing urine or stool, especially if you have mild pain or swelling in your lower belly. You have hot flashes, sweating, flushing, or a fast or pounding heartbeat. Bright red blood has soaked through the bandage over your incision. Watch closely for any changes in your health, and be sure to contact your doctor if:  Your swelling is getting worse. Your swelling is not going down.

## 2023-02-27 NOTE — INTERVAL H&P NOTE
Update History & Physical    The Patient's History and Physical was reviewed with the patient and I examined the patient. There was no change. The surgical site was confirmed by the patient and me. Plan:  The risk, benefits, expected outcome, and alternative to the recommended procedure have been discussed with the patient. Patient understands and wants to proceed with the procedure.     Electronically signed by Amadeo Vernon MD on 2/27/2023 at 7:33 AM

## 2023-02-27 NOTE — PERIOP NOTES
Jennifer Grantijrohit 89 from Operating Room to PACU    Report received from [address] and [address] regarding Monalisa Hall. Surgeon(s):  Dede Green MD  And Procedure(s) (LRB):  ROBOTIC ASSISTED BILATERAL INGUINAL HERNIA REPAIR WITH MESH (Right)  confirmed   with allergies and dressings discussed. Anesthesia type, drugs, patient history, complications, estimated blood loss, vital signs, intake and output, and last pain medication, lines, reversal medications, and temperature were reviewed. 1417 TRANSFER - OUT REPORT:    Verbal report given to [facility] - MANNIE FIELDS RN(name) on Monalisa Hall  being transferred to [facility](unit) for routine post - op       Report consisted of patients Situation, Background, Assessment and   Recommendations(SBAR). Information from the following report(s) SBAR, Kardex, OR Summary, Intake/Output, MAR, and Cardiac Rhythm SR  was reviewed with the receiving nurse. Lines:   Peripheral IV 02/27/23 Distal;Left;Outer Forearm (Active)   Site Assessment Clean, dry, & intact 02/27/23 0934   Phlebitis Assessment 0 02/27/23 0934   Infiltration Assessment 0 02/27/23 0934   Dressing Status Clean, dry, & intact 02/27/23 0934   Dressing Type Transparent;Tape 02/27/23 0934   Hub Color/Line Status Pink; Infusing 02/27/23 0934        Opportunity for questions and clarification was provided.       Patient transported with:   O2 @ 2 liters  Tech

## 2023-02-27 NOTE — ANESTHESIA POSTPROCEDURE EVALUATION
Procedure(s):  ROBOTIC ASSISTED BILATERAL INGUINAL HERNIA REPAIR WITH MESH. general    Anesthesia Post Evaluation        Patient location during evaluation: PACU  Note status: Adequate. Level of consciousness: responsive to verbal stimuli and sleepy but conscious  Pain management: satisfactory to patient  Airway patency: patent  Anesthetic complications: no  Cardiovascular status: acceptable  Respiratory status: acceptable  Hydration status: acceptable  Comments: +Post-Anesthesia Evaluation and Assessment    Patient: Lynford Holter MRN: [id number]  SSN: [id number]   YOB: 1940  Age: 80 y.o. Sex: male          Cardiovascular Function/Vital Signs    BP (!) 144/67   Pulse 66   Temp 36.4 °C (97.5 °F)   Resp 19   Ht 5' 10\" (1.778 m)   Wt 75.4 kg (166 lb 3.6 oz)   SpO2 100%   BMI 23.85 kg/m²     Patient is status post Procedure(s):  ROBOTIC ASSISTED BILATERAL INGUINAL HERNIA REPAIR WITH MESH. Nausea/Vomiting: Controlled. Postoperative hydration reviewed and adequate. Pain:  Pain Scale 1: Numeric (0 - 10) (02/27/23 0654)  Pain Intensity 1: 6 (02/27/23 0654)   Managed. Neurological Status:   Neuro (WDL): Within Defined Limits (02/27/23 0647)   At baseline. Mental Status and Level of Consciousness: Arousable. Pulmonary Status:   O2 Device: Nasal cannula (02/27/23 0934)   Adequate oxygenation and airway patent. Complications related to anesthesia: None    Post-anesthesia assessment completed. No concerns. I have evaluated the patient and the patient is stable and ready to be discharged from PACU . Signed By: Su Bell MD    2/27/2023        INITIAL Post-op Vital signs:   Vitals Value Taken Time   /67 02/27/23 0950   Temp 36.4 °C (97.5 °F) 02/27/23 0934   Pulse 68 02/27/23 0953   Resp 12 02/27/23 0953   SpO2 100 % 02/27/23 0953   Vitals shown include unvalidated device data.

## 2023-02-27 NOTE — OP NOTES
OPERATIVE REPORT  2/27/2023    PREOPERATIVE DIAGNOSES:    right inguinal hernia. POSTOPERATIVE DIAGNOSES:   moderate RIGHT indirect and  moderate  LEFT direct and indirect inguinal hernias. OPERATIVE PROCEDURE:  1. Laparoscopic repair of bilateral  inguinal hernia with mesh, robot assisted. SURGEON:  Dixon Young MD    OR STAFF: Circ-1: Chelsey Hassan RN  Scrub Tech-1: Stefan HUTCHINS  Surg Asst-1: Izabel Carrillo RN    ANESTHESIA:  General plus local.    Specimens: * No specimens in log *     COMPLICATIONS:  None. ESTIMATED BLOOD LOSS:  Minimal.       Event Time In   Incision Start 0800   Incision Close         Implants:   Implant Name Type Inv. Item Serial No.  Lot No. LRB No. Used Action   MESH ADELE [id number] INGUINAL POLYPR SQ L PORE MFIL SFT KNIT - SNA Mesh MESH ADELE [id number] INGUINAL POLYPR SQ L PORE MFIL SFT KNIT NA BARD DAVOL_WD [id number] Right 1 Implanted   MESH ADELE [id number] INGUINAL POLYPR SQ L PORE MFIL SFT KNIT - SNA Mesh MESH ADELE [id number] INGUINAL POLYPR SQ L PORE MFIL SFT KNIT NA BARD DAVOL_WD [id number] Left 1 Implanted       INDICATION FOR PROCEDURE: Karina Randall is a 80 y.o. male presented to the office with groin pain. On exam, he was noted to have a hernia. He is brought to the operating room for repair. Risks, benefits and alternatives were discussed. Consent form was placed in the chart. DESCRIPTION OF PROCEDURE: The patient was brought to operating room number 1, with consent form signed and on the chart and the site of surgery marked. After satisfactory induction of general anesthesia, the patient was kept in the supine position, bilateral arms tucked to his sides, with appropriate padding. The patient's abdomen was prepped and draped sterilely, including use of Ioban. After appropriate time-out was held, the skin was infused with local anesthetic, an incision was made at the umbilicus.  A 5-mm optical entry trocar was placed intra-abdominally under visualization. Pneumoperitoneum was achieved to 15 mmHg. 8-mm robotic port was placed on the right and left side of the umbilicus. The 5-mm umbilical trocar was switched to an 8-mm port. The abdomen was explored, with findings noted above. The patient was placed in Trendelenburg position, the robot was docked and robotic instruments were inserted. The abdomen was explored, with findings noted as above. The peritoneum was incised, and the peritoneal flap was created on the Right. This was carried down towards Daniel's ligament medially and the abdominal side wall laterally. Appropriate landmarks were identified. The cord structures were dissected free of the peritoneum, and the peritoneum was taken down beyond the splaying of the cord. The hernia defect was reapproximated with 3-0 absorbable barbed suture. Once dissection was completed,  a 10 cm x 15 cm mesh was inserted and set into position. It was secured with a 3-0 PDS suture on Daniel's ligament and an additional suture to the medial anterior abdominal wall. It was noted to be lying appropriately and in good position. Hemostasis was ensured. A 3-0 barbed absorbable suture was used to reapproximate the peritoneum. Next, I turned my attention to the contralateral side. Repair was proceeded in a similar fashion with findings as above. Similar mesh was inserted and secured. It was noted to be laying appropriately and in a good position. Once completed, the umbilical fascia was reapproximated with 0-vicryl using a Javier-Bone. The ports were removed sequentially under visualization, and the pneumoperitoneum was allowed to escape. All skin incisions were closed with subcuticular Monocryl and Dermabond. The patient remained stable during my presence in the operating room.

## 2023-02-27 NOTE — ANESTHESIA PREPROCEDURE EVALUATION
Relevant Problems   No relevant active problems       Anesthetic History   No history of anesthetic complications            Review of Systems / Medical History  Patient summary reviewed, nursing notes reviewed and pertinent labs reviewed    Pulmonary  Within defined limits                 Neuro/Psych   Within defined limits           Cardiovascular  Within defined limits  Hypertension          Hyperlipidemia    Exercise tolerance: >4 METS     GI/Hepatic/Renal  Within defined limits              Endo/Other  Within defined limits  Diabetes    Arthritis     Other Findings              Physical Exam    Airway  Mallampati: II  TM Distance: > 6 cm  Neck ROM: normal range of motion   Mouth opening: Normal     Cardiovascular  Regular rate and rhythm,  S1 and S2 normal,  no murmur, click, rub, or gallop             Dental  No notable dental hx       Pulmonary  Breath sounds clear to auscultation               Abdominal  GI exam deferred       Other Findings            Anesthetic Plan    ASA: 3  Anesthesia type: general          Induction: Intravenous  Anesthetic plan and risks discussed with: Patient

## 2023-02-28 LAB
ANION GAP SERPL CALC-SCNC: 2 MMOL/L (ref 5–15)
BUN SERPL-MCNC: 18 MG/DL (ref 6–20)
BUN/CREAT SERPL: 25 (ref 12–20)
CALCIUM SERPL-MCNC: 8.5 MG/DL (ref 8.5–10.1)
CHLORIDE SERPL-SCNC: 104 MMOL/L (ref 97–108)
CO2 SERPL-SCNC: 30 MMOL/L (ref 21–32)
CREAT SERPL-MCNC: 0.73 MG/DL (ref 0.7–1.3)
GLUCOSE BLD STRIP.AUTO-MCNC: 113 MG/DL (ref 65–117)
GLUCOSE BLD STRIP.AUTO-MCNC: 118 MG/DL (ref 65–117)
GLUCOSE BLD STRIP.AUTO-MCNC: 94 MG/DL (ref 65–117)
GLUCOSE SERPL-MCNC: 119 MG/DL (ref 65–100)
POTASSIUM SERPL-SCNC: 4 MMOL/L (ref 3.5–5.1)
SERVICE CMNT-IMP: ABNORMAL
SERVICE CMNT-IMP: NORMAL
SERVICE CMNT-IMP: NORMAL
SODIUM SERPL-SCNC: 136 MMOL/L (ref 136–145)

## 2023-02-28 PROCEDURE — 97161 PT EVAL LOW COMPLEX 20 MIN: CPT

## 2023-02-28 PROCEDURE — 74011250636 HC RX REV CODE- 250/636: Performed by: SURGERY

## 2023-02-28 PROCEDURE — 80048 BASIC METABOLIC PNL TOTAL CA: CPT

## 2023-02-28 PROCEDURE — 97535 SELF CARE MNGMENT TRAINING: CPT | Performed by: OCCUPATIONAL THERAPIST

## 2023-02-28 PROCEDURE — 94760 N-INVAS EAR/PLS OXIMETRY 1: CPT

## 2023-02-28 PROCEDURE — 97165 OT EVAL LOW COMPLEX 30 MIN: CPT | Performed by: OCCUPATIONAL THERAPIST

## 2023-02-28 PROCEDURE — 74011250637 HC RX REV CODE- 250/637: Performed by: SURGERY

## 2023-02-28 PROCEDURE — 97530 THERAPEUTIC ACTIVITIES: CPT

## 2023-02-28 PROCEDURE — 82962 GLUCOSE BLOOD TEST: CPT

## 2023-02-28 PROCEDURE — 97116 GAIT TRAINING THERAPY: CPT

## 2023-02-28 PROCEDURE — 36415 COLL VENOUS BLD VENIPUNCTURE: CPT

## 2023-02-28 PROCEDURE — 74011000250 HC RX REV CODE- 250: Performed by: SURGERY

## 2023-02-28 PROCEDURE — 77010033678 HC OXYGEN DAILY

## 2023-02-28 RX ORDER — KETOROLAC TROMETHAMINE 30 MG/ML
15 INJECTION, SOLUTION INTRAMUSCULAR; INTRAVENOUS EVERY 6 HOURS
Status: DISCONTINUED | OUTPATIENT
Start: 2023-02-28 | End: 2023-03-01 | Stop reason: HOSPADM

## 2023-02-28 RX ADMIN — PAROXETINE HYDROCHLORIDE 20 MG: 20 TABLET, FILM COATED ORAL at 08:57

## 2023-02-28 RX ADMIN — Medication 1 AMPULE: at 21:13

## 2023-02-28 RX ADMIN — ACETAMINOPHEN 325MG 650 MG: 325 TABLET ORAL at 00:55

## 2023-02-28 RX ADMIN — ENOXAPARIN SODIUM 40 MG: 100 INJECTION SUBCUTANEOUS at 08:57

## 2023-02-28 RX ADMIN — OXYCODONE 5 MG: 5 TABLET ORAL at 06:17

## 2023-02-28 RX ADMIN — Medication 1 AMPULE: at 09:00

## 2023-02-28 RX ADMIN — LATANOPROST 1 DROP: 50 SOLUTION OPHTHALMIC at 18:23

## 2023-02-28 RX ADMIN — ATORVASTATIN CALCIUM 10 MG: 10 TABLET, FILM COATED ORAL at 08:57

## 2023-02-28 RX ADMIN — DORZOLAMIDE HYDROCHLORIDE AND TIMOLOL MALEATE 1 DROP: 20; 5 SOLUTION/ DROPS OPHTHALMIC at 18:23

## 2023-02-28 RX ADMIN — SODIUM CHLORIDE, PRESERVATIVE FREE 10 ML: 5 INJECTION INTRAVENOUS at 22:00

## 2023-02-28 RX ADMIN — POLYETHYLENE GLYCOL 3350 17 G: 17 POWDER, FOR SOLUTION ORAL at 08:57

## 2023-02-28 RX ADMIN — LOSARTAN POTASSIUM 50 MG: 50 TABLET, FILM COATED ORAL at 08:57

## 2023-02-28 RX ADMIN — ACETAMINOPHEN 325MG 650 MG: 325 TABLET ORAL at 18:23

## 2023-02-28 RX ADMIN — ACETAMINOPHEN 325MG 650 MG: 325 TABLET ORAL at 13:19

## 2023-02-28 RX ADMIN — SODIUM CHLORIDE, PRESERVATIVE FREE 10 ML: 5 INJECTION INTRAVENOUS at 06:38

## 2023-02-28 RX ADMIN — HYDROMORPHONE HYDROCHLORIDE 0.5 MG: 1 INJECTION, SOLUTION INTRAMUSCULAR; INTRAVENOUS; SUBCUTANEOUS at 08:54

## 2023-02-28 RX ADMIN — DOCUSATE SODIUM - SENNOSIDES 1 TABLET: 50; 8.6 TABLET, FILM COATED ORAL at 08:57

## 2023-02-28 RX ADMIN — DORZOLAMIDE HYDROCHLORIDE AND TIMOLOL MALEATE 1 DROP: 20; 5 SOLUTION/ DROPS OPHTHALMIC at 08:58

## 2023-02-28 RX ADMIN — DOXAZOSIN 8 MG: 2 TABLET ORAL at 21:13

## 2023-02-28 RX ADMIN — OXYCODONE 5 MG: 5 TABLET ORAL at 00:55

## 2023-02-28 RX ADMIN — KETOROLAC TROMETHAMINE 15 MG: 30 INJECTION, SOLUTION INTRAMUSCULAR; INTRAVENOUS at 18:23

## 2023-02-28 RX ADMIN — ACETAMINOPHEN 325MG 650 MG: 325 TABLET ORAL at 06:17

## 2023-02-28 NOTE — PROGRESS NOTES
End of Shift Note    Bedside shift change report given to Cinthya (oncoming nurse) by Jazmyne Wells (offgoing nurse). Report included the following information SBAR, Kardex, and MAR    Shift worked: 4929-3347     Shift summary and any significant changes:     Pt ambulated to bathroom, voided 100mL. Post void trial: 0mL. Up to chair for dinner. 5mg Marisel x2 during shift. Concerns for physician to address:  none     Zone phone for oncoming shift:   [phone]       Activity:  Activity Level:  Up with Assistance  Number times ambulated in hallways past shift: 0  Number of times OOB to chair past shift: 1    Cardiac:   Cardiac Monitoring: No      Cardiac Rhythm: Sinus Rhythm    Access:  Current line(s): PIV     Genitourinary:   Urinary status: voiding    Respiratory:   O2 Device: Nasal cannula  Chronic home O2 use?: NO  Incentive spirometer at bedside: YES       GI:     Current diet:  ADULT DIET Regular; 5 carb choices (75 gm/meal)  Passing flatus: NO  Tolerating current diet: YES       Pain Management:   Patient states pain is manageable on current regimen: YES    Skin:  Raymond Score: 21  Interventions: increase time out of bed    Patient Safety:  Fall Score:    Interventions: assistive device (walker, cane, etc), gripper socks, pt to call before getting OOB, and stay with me (per policy)       Length of Stay:  Expected LOS: - - -  Actual LOS: 0      Jazmyne Wells

## 2023-02-28 NOTE — PROGRESS NOTES
End of Shift Note    Bedside shift change report given to Juventino Pinedo RN (oncoming nurse) by Estephania Olvera LPN (offgoing nurse). Report included the following information SBAR, Kardex, Procedure Summary, Intake/Output, MAR, and Recent Results    Shift worked:  7p-730a     Shift summary and any significant changes:     Pt rested in bed through shift. Treated pain prn-see MAR. Up w/ assistance w/ walker as pt. Concerns for physician to address:       Zone phone for oncoming shift:   [phone]       Activity:  Activity Level:  Up with Assistance  Number times ambulated in hallways past shift: 0  Number of times OOB to chair past shift: 1    Cardiac:   Cardiac Monitoring: No      Cardiac Rhythm: Sinus Rhythm    Access:   Current line(s): PIV     Genitourinary:   Urinary status: voiding    Respiratory:   O2 Device: None (Room air)  Chronic home O2 use?: NO  Incentive spirometer at bedside: YES     GI:     Current diet:  ADULT DIET Regular; 5 carb choices (75 gm/meal)  Passing flatus: NO  Tolerating current diet: YES       Pain Management:   Patient states pain is manageable on current regimen: YES    Skin:  Raymond Score: 21  Interventions: increase time out of bed and PT/OT consult    Patient Safety:  Fall Score:    Interventions: assistive device (walker, cane, etc), gripper socks, pt to call before getting OOB, and stay with me (per policy)       Length of Stay:  Expected LOS: - - -  Actual LOS: 0      Estephania Olvera LPN

## 2023-02-28 NOTE — PROGRESS NOTES
End of Shift Note    Bedside shift change report given to Darleen Lewis (oncoming nurse) by Duncan Heredia RN (offgoing nurse). Report included the following information SBAR, Kardex, Intake/Output, MAR, and Recent Results    Shift worked:  7a-7p     Shift summary and any significant changes:     Nerve block administered to L groin under US guidance, toradol added q6, planning for DC tomorrow, fluids stopped     Concerns for physician to address:  Wants to have a      Zone phone for oncoming shift:   [phone]       Activity:  Activity Level: Dangle Side of Bed, Up with Assistance  Number times ambulated in hallways past shift: 1  Number of times OOB to chair past shift: 5    Cardiac:   Cardiac Monitoring: No      Cardiac Rhythm: Sinus Rhythm    Access:  Current line(s): PIV     Genitourinary:   Urinary status: voiding    Respiratory:   O2 Device: None (Room air)  Chronic home O2 use?: NO  Incentive spirometer at bedside: YES       GI:  Last Bowel Movement Date:  (PTA)  Current diet:  ADULT DIET Regular; 5 carb choices (75 gm/meal)  Passing flatus: NO  Tolerating current diet: YES       Pain Management:   Patient states pain is manageable on current regimen: YES s/p nerve block    Skin:  Raymond Score: 20  Interventions: increase time out of bed and PT/OT consult    Patient Safety:  Fall Score:  Total Score: 2  Interventions: bed/chair alarm, assistive device (walker, cane, etc), gripper socks, pt to call before getting OOB, and stay with me (per policy)       Length of Stay:  Expected LOS: - - -  Actual LOS: 0      Duncan Heredia RN

## 2023-02-28 NOTE — PROGRESS NOTES
PHYSICAL THERAPY EVALUATION/DISCHARGE  Patient: Jordis Soulier (63 y.o. male)  Date: 2/28/2023  Primary Diagnosis: Inguinal hernia bilateral, non-recurrent [K40.20]  Procedure(s) (LRB):  ROBOTIC ASSISTED BILATERAL INGUINAL HERNIA REPAIR WITH MESH (Right) 1 Day Post-Op   Precautions:   Fall      ASSESSMENT  Based on the objective data described below, the patient presents with impaired functional mobility due to generalized weakness and B inguinal pain from recent B hernia repair. Pt standing preparing to go for a walk on arrival.  He reports his pain at 6-7/10, but able to demonstrate all mobility at S to mod I with use of RW. Pt also able to ambulate short distances in the room with S.  Gait is antalgic, but no LOB noted. Pt also able to navigate 11 stairs with a rail with S to mod I. No other deficits noted that warrant further PT intervention in the acute setting or at discharge. Will sign off. Functional Outcome Measure: The patient scored 22/24 on the [facility] outcome measure. Other factors to consider for discharge: moving fairly well, has support at home     Further skilled acute physical therapy is not indicated at this time. PLAN :  Recommendation for discharge: (in order for the patient to meet his/her long term goals)  No skilled physical therapy/ follow up rehabilitation needs identified at this time. This discharge recommendation:  A follow-up discussion with the attending provider and/or case management is planned    IF patient discharges home will need the following DME: patient owns DME required for discharge       SUBJECTIVE:   Patient stated I think I will be fine, but this pain is worse than my knee replacement.     OBJECTIVE DATA SUMMARY:   HISTORY:    Past Medical History:   Diagnosis Date    Arthritis     Diabetes ([facility] [address]     Glaucoma     Hernia, inguinal, right     Hypercholesteremia     Hypertension     Ill-defined condition     clausterphobia    Ill-defined condition complete break in cervical vertebrae unsure of which one     Past Surgical History:   Procedure Laterality Date    HX CYST REMOVAL      groin    HX HEENT      teeth implants on bottom    HX KNEE ARTHROSCOPY Left     HX KNEE REPLACEMENT Left     HX ORTHOPAEDIC Right     carpal tunnel surgery    HX SEPTOPLASTY      repaired deviated septum from cocaine use in distant past    HX TRABECULECTOMY Bilateral        Prior level of function: I, no ad, very active, loves being outside doing yard work  Personal factors and/or comorbidities impacting plan of care: pain    Home Situation  Home Environment: Private residence  # Steps to Enter: 7  Rails to Enter: Yes  Hand Rails : Left  One/Two Story Residence: Two story  # of Interior Steps: [address]:  (both on 1st 7 and none on next 7)  Lift Chair Available: No  Living Alone: No  Support Systems: Spouse/Significant Other  Patient Expects to be Discharged to[de-identified] Home with home health  Current DME Used/Available at Home: Suzan Eric, straight, Walker, rolling  Tub or Shower Type: Shower    EXAMINATION/PRESENTATION/DECISION MAKING:   Critical Behavior:  Neurologic State: Alert, Appropriate for age  Orientation Level: Oriented X4, Appropriate for age  Cognition: Appropriate decision making, Appropriate for age attention/concentration, Appropriate safety awareness  Safety/Judgement: Awareness of environment, Fall prevention, Insight into deficits  Hearing: Auditory  Auditory Impairment: Hard of hearing, bilateral, Hearing aid(s)  Hearing Aids/Status:  At bedside, Functioning, Bilateral, With patient  Range Of Motion:  AROM: Generally decreased, functional           PROM: Generally decreased, functional           Strength:    Strength: Generally decreased, functional                    Tone & Sensation:   Tone: Normal              Sensation: Intact               Coordination:  Coordination: Within functional limits  Vision:   Acuity: Able to read clock/calendar on wall without difficulty; Able to read employee name badge without difficulty  Corrective Lenses: Glasses  Functional Mobility:  Bed Mobility:  Rolling: Modified independent;Supervision; Additional time  Supine to Sit: Supervision;Modified independent; Additional time  Sit to Supine: Supervision;Modified independent; Additional time  Scooting: Modified independent  Transfers:  Sit to Stand: Supervision;Modified independent  Stand to Sit: Supervision;Modified independent        Bed to Chair: Supervision;Modified independent              Balance:   Sitting: Intact  Standing: Impaired  Standing - Static: Good; Unsupported  Standing - Dynamic : Good;Fair;Occasional;Unsupported  Ambulation/Gait Training:  Distance (ft): 170 Feet (ft)  Assistive Device: Walker, rolling (short distance in room without ad)  Ambulation - Level of Assistance: Stand-by assistance;Supervision        Gait Abnormalities: Decreased step clearance; Antalgic        Base of Support: Widened  Stance: Right decreased; Left decreased  Speed/Shelly: Pace decreased (<100 feet/min); Slow  Step Length: Right shortened;Left shortened      Stairs:  Number of Stairs Trained: 11  Stairs - Level of Assistance: Modified independent   Rail Use: Left     Functional Measure:  [facility] AM-PAC®      Basic Mobility Inpatient Short Form (6-Clicks) Version 2  How much HELP from another person do you currently need. .. (If the patient hasn't done an activity recently, how much help from another person do you think they would need if they tried?) Total A Lot A Little None   1. Turning from your back to your side while in a flat bed without using bedrails? []  1 []  2 [x]  3  []  4   2. Moving from lying on your back to sitting on the side of a flat bed without using bedrails? []  1 []  2 [x]  3  []  4   3. Moving to and from a bed to a chair (including a wheelchair)? []  1 []  2 []  3  [x]  4   4. Standing up from a chair using your arms (e.g. wheelchair or bedside chair)?  []  1 [] 2 []  3  [x]  4   5. Walking in hospital room? []  1 []  2 []  3  [x]  4   6. Climbing 3-5 steps with a railing? []  1 []  2 []  3  [x]  4     Raw Score: 22/24                            Cutoff score ?171,2,3 had higher odds of discharging home with home health or need of SNF/IPR. 1509 Gabe Crawford, Leilani Maravilla, Hanley Heymann Raynold Cockayne Colie Sklakia. Validity of the AM-PAC 6-Clicks Inpatient Daily Activity and Basic Mobility Short Forms. Physical Therapy Mar 2014, 94 (3) 379-391; DOI: 10.2522/ptj.[id number]  2. Romina Dry. Association of AM-PAC \"6-Clicks\" Basic Mobility and Daily Activity Scores With Discharge Destination. Phys Ther. 2021 4;101(4):wahr051. doi: 10.1093/ptj/jscs803. PMID: 49873202. V Becki Ryan, Jesse D, Ela Sanchez, Palak K, Johnnie S. Activity Measure for Post-Acute Care \"6-Clicks\" Basic Mobility Scores Predict Discharge Destination After Acute Care Hospitalization in Select Patient Groups: A Retrospective, Observational Study. Arch Rehabil Res Clin Transl. 2022 16;4(3):024046. doi: 10.1016/j.arrct. 9630.229429. PMID: 13488856; PMCID: [id number]. 4. [place] A  Pain Ratin-7/10    Activity Tolerance:   Good, Fair, SpO2 stable on RA, and limited by pain      After treatment patient left in no apparent distress:   Supine in bed, Call bell within reach, and Side rails x 3    COMMUNICATION/EDUCATION:   The patients plan of care was discussed with: Occupational therapist, Registered nurse, and NP . Fall prevention education was provided and the patient/caregiver indicated understanding.     Thank you for this referral.  Marjorie Morales, PT   Time Calculation: 31 mins

## 2023-02-28 NOTE — PROGRESS NOTES
Problem: Self Care Deficits Care Plan (Adult)  Goal: *Acute Goals and Plan of Care (Insert Text)  Description: FUNCTIONAL STATUS PRIOR TO ADMISSION: Patient was independent and active without use of DME. Drives. HOME SUPPORT: The patient lived with wife but did not require assist.    Occupational Therapy Goals  Initiated 2/28/2023  1. Patient will perform grooming standing at sink with supervision/set-up within 7 day(s). 2.  Patient will perform lower body dressing using adaptive equipment as needed with supervision/set-up within 7 day(s). 3.  Patient will perform toilet transfers with supervision/set-up within 7 day(s). 4.  Patient will perform all aspects of toileting with supervision/set-up within 7 day(s). 5.  Patient will participate in upper extremity therapeutic exercise/activities with independence for 10 minutes within 7 day(s). 6.  Patient will utilize energy conservation techniques during functional activities with verbal and visual cues within 7 day(s). Outcome: Progressing Towards Goal     OCCUPATIONAL THERAPY EVALUATION  Patient: Karina Randall (2 y.o. male)  Date: 2/28/2023  Primary Diagnosis: Inguinal hernia bilateral, non-recurrent [K40.20]  Procedure(s) (LRB):  ROBOTIC ASSISTED BILATERAL INGUINAL HERNIA REPAIR WITH MESH (Right) 1 Day Post-Op   Precautions: Fall    ASSESSMENT  Based on the objective data described below, the patient presents with pain 8/10 LLE groin area, decreased strength, endurance, mobility, balance and safety POD 1 B inguinal hernia repair. Pt requires up to min A for LE ADLs, toileting and functional mobility using RW to amb mostly due to pain. Pt has a supportive wife at home that can assist as needed per pt. Recommend HH therapy at discharge. Current Level of Function Impacting Discharge (ADLs/self-care): min A for LE ADLs, toileting and functional mobility using RW to amb    Functional Outcome Measure:   The patient scored Total: 55/100 on the Barthel Index outcome measure. Other factors to consider for discharge: see above     Patient will benefit from skilled therapy intervention to address the above noted impairments. PLAN :  Recommendations and Planned Interventions: self care training, functional mobility training, therapeutic exercise, balance training, therapeutic activities, endurance activities, neuromuscular re-education, patient education, home safety training, and family training/education    Frequency/Duration: Patient will be followed by occupational therapy 5 times a week to address goals. Recommendation for discharge: (in order for the patient to meet his/her long term goals)  Occupational therapy at least 2 days/week in the home AND ensure assist and/or supervision for safety     This discharge recommendation:  Has not yet been discussed the attending provider and/or case management    IF patient discharges home will need the following DME: TBD       SUBJECTIVE:   Patient stated My L groin hurts so bad.     OBJECTIVE DATA SUMMARY:   HISTORY:   Past Medical History:   Diagnosis Date    Arthritis     Diabetes ([facility] [address]     Glaucoma     Hernia, inguinal, right     Hypercholesteremia     Hypertension     Ill-defined condition     clausterphobia    Ill-defined condition     complete break in cervical vertebrae unsure of which one     Past Surgical History:   Procedure Laterality Date    HX CYST REMOVAL      groin    HX HEENT      teeth implants on bottom    HX KNEE ARTHROSCOPY Left     HX KNEE REPLACEMENT Left     HX ORTHOPAEDIC Right     carpal tunnel surgery    HX SEPTOPLASTY      repaired deviated septum from cocaine use in distant past    HX TRABECULECTOMY Bilateral        Expanded or extensive additional review of patient history:     Home Situation  Home Environment: Private residence  # Steps to Enter: 7  Rails to Enter: Yes  Hand Rails : Left  One/Two Story Residence: Two story  # of Interior Steps: [address]: (both on 1st 7 and none on next 7)  Lift Chair Available: No  Living Alone: No  Support Systems: Spouse/Significant Other  Patient Expects to be Discharged to[de-identified] Home with home health  Current DME Used/Available at Home: Alex Villela, rosy, Walker, rolling  Tub or Shower Type: Shower    Hand dominance: Right    EXAMINATION OF PERFORMANCE DEFICITS:  Cognitive/Behavioral Status:  Neurologic State: Alert; Appropriate for age  Orientation Level: Oriented X4  Cognition: Appropriate decision making; Appropriate for age attention/concentration; Appropriate safety awareness; Follows commands  Perception: Appears intact  Perseveration: No perseveration noted  Safety/Judgement: Awareness of environment; Fall prevention; Insight into deficits    Hearing: Auditory  Auditory Impairment: Hard of hearing, bilateral, Hearing aid(s)  Hearing Aids/Status: At bedside, Functioning, Bilateral, With patient    Vision/Perceptual:    Acuity: Able to read clock/calendar on wall without difficulty; Able to read employee name badge without difficulty    Corrective Lenses: Glasses    Range of Motion:  AROM: Generally decreased, functional  PROM: Generally decreased, functional                      Strength:  Strength: Generally decreased, functional                Coordination:  Coordination: Within functional limits  Fine Motor Skills-Upper: Left Intact; Right Intact    Gross Motor Skills-Upper: Left Intact; Right Intact    Tone & Sensation:  Tone: Normal  Sensation: Intact                      Balance:  Sitting: Intact  Standing: Impaired; With support  Standing - Static: Fair  Standing - Dynamic : Fair    Functional Mobility and Transfers for ADLs:  Bed Mobility:  Rolling: Supervision; Additional time  Supine to Sit: Contact guard assistance; Additional time;Assist x1 (from sidelying)  Sit to Supine: Contact guard assistance; Additional time (from sidelying)  Scooting: Stand-by assistance    Transfers:  Sit to Stand: Contact guard assistance; Additional time  Stand to Sit: Contact guard assistance; Additional time  Bed to Chair: Contact guard assistance; Additional time  Bathroom Mobility: Contact guard assistance  Toilet Transfer : Contact guard assistance  Assistive Device : Walker, rolling    ADL Assessment:  Feeding: Independent    Oral Facial Hygiene/Grooming: Contact guard assistance; Additional time (standing x3 minutes)    Bathing: Minimum assistance; Additional time;Assist x1 (seated- unable to cross LLE to reach feet due to pain)    Type of Bath: Patient refused    Upper Body Dressing: Setup    Lower Body Dressing: Minimum assistance; Additional time;Assist x1 (seated- unable to cross LLE to reach feet due to pain)    Toileting: Minimum assistance; Additional time;Assist x1 (A for safety with standing balance)                ADL Intervention and task modifications:  Patient instructed and indicated understanding energy conservation techniques to increase independence and safety during ADLs. Cognitive Retraining  Safety/Judgement: Awareness of environment; Fall prevention; Insight into deficits      Functional Measure:    Barthel Index:  Bathin  Bladder: 10  Bowels: 10  Groomin  Dressin  Feeding: 10  Mobility: 0  Stairs: 0  Toilet Use: 5  Transfer (Bed to Chair and Back): 10  Total: 55/100      The Barthel ADL Index: Guidelines  1. The index should be used as a record of what a patient does, not as a record of what a patient could do. 2. The main aim is to establish degree of independence from any help, physical or verbal, however minor and for whatever reason. 3. The need for supervision renders the patient not independent. 4. A patient's performance should be established using the best available evidence. Asking the patient, friends/relatives and nurses are the usual sources, but direct observation and common sense are also important. However direct testing is not needed.   5. Usually the patient's performance over the preceding 24-48 hours is important, but occasionally longer periods will be relevant. 6. Middle categories imply that the patient supplies over 50 per cent of the effort. 7. Use of aids to be independent is allowed. Score Interpretation (from [address])    Independent   60-79 Minimally independent   40-59 Partially dependent   20-39 Very dependent   <20 Totally dependent     -Kasi Rodriguez., Barthel, D.W. (1965). Functional evaluation: the Barthel Index. [address] ([address] (). The Barthel activities of daily living index: self-reporting versus actual performance in the old (> or = 75 years). Journal of [address] [place] [address] 45(7), 14 [facility], LEIGHANN, Coty Noyola., Elena Jordan. (1999). Measuring the change in disability after inpatient rehabilitation; comparison of the responsiveness of the Barthel Index and Functional Henderson Measure. Journal of Neurology, Neurosurgery, and Psychiatry, 66(4), 201-909. Anita Valdovinos, N.J.A, KELL Bliss, & Negin Mora M.A. (2004) Assessment of post-stroke quality of life in cost-effectiveness studies: The usefulness of the Barthel Index and the EuroQoL-5D.  Quality of Life Research, 15, 864-00     Occupational Therapy Evaluation Charge Determination   History Examination Decision-Making   LOW Complexity : Brief history review  MEDIUM Complexity : 3-5 performance deficits relating to physical, cognitive , or psychosocial skils that result in activity limitations and / or participation restrictions LOW Complexity : No comorbidities that affect functional and no verbal or physical assistance needed to complete eval tasks       Based on the above components, the patient evaluation is determined to be of the following complexity level: LOW   Pain Ratin/10    Activity Tolerance:   Fair and requires frequent rest breaks    After treatment patient left in no apparent distress:    Supine in bed and Call bell within reach    COMMUNICATION/EDUCATION:   The patients plan of care was discussed with: Registered nurse. Home safety education was provided and the patient/caregiver indicated understanding., Patient/family have participated as able in goal setting and plan of care. , and Patient/family agree to work toward stated goals and plan of care. This patients plan of care is appropriate for delegation to JUSTYN.     Thank you for this referral.  Yovani Torres OT  Time Calculation: 21 mins

## 2023-02-28 NOTE — PROGRESS NOTES
Progress Note    Patient: Geo Espino MRN: [id number]  SSN: [id number]    YOB: 1940  Age: 80 y.o. Sex: male      Admit Date: 2023    1 Day Post-Op    Procedure:  Procedure(s):  ROBOTIC ASSISTED BILATERAL INGUINAL HERNIA REPAIR WITH MESH    Subjective:     Patient is having pain in his left inguinal region. Tolerating a diet. Objective:     Visit Vitals  /66 (BP 1 Location: Left upper arm, BP Patient Position: At rest)   Pulse 63   Temp 98.2 °F (36.8 °C)   Resp 18   Ht 5' 10\" (1.778 m)   Wt 75.4 kg (166 lb 3.6 oz)   SpO2 96%   BMI 23.85 kg/m²       Temp (24hrs), Av.7 °F (36.5 °C), Min:97.5 °F (36.4 °C), Max:98.2 °F (36.8 °C)      Physical Exam:    Gen: NAD  Abd: incisions c/d/I, appropriately tender to palpation  B/l groins: no swelling    Data Review: images and reports reviewed    Lab Review: All lab results for the last 24 hours reviewed.     Assessment:     Hospital Problems  Date Reviewed: 3/13/2017            Codes Class Noted POA    Inguinal hernia bilateral, non-recurrent ICD-10-CM: K40.20  ICD-9-CM: 550.92  2023 Unknown           Plan/Recommendations/Medical Decision Making:     Injected ilioinguinal nerve under US guidance with 5cc 2% lidocaine and triamcinolone - patient had much relief afterwards  Diet as tolerated  Plan for dc tomorrow pending pain control    Garry Lynn MD details…

## 2023-03-01 VITALS
HEART RATE: 63 BPM | TEMPERATURE: 98.1 F | SYSTOLIC BLOOD PRESSURE: 130 MMHG | RESPIRATION RATE: 16 BRPM | WEIGHT: 166.23 LBS | DIASTOLIC BLOOD PRESSURE: 86 MMHG | OXYGEN SATURATION: 96 % | HEIGHT: 70 IN | BODY MASS INDEX: 23.8 KG/M2

## 2023-03-01 PROCEDURE — 94760 N-INVAS EAR/PLS OXIMETRY 1: CPT

## 2023-03-01 PROCEDURE — 74011250636 HC RX REV CODE- 250/636: Performed by: SURGERY

## 2023-03-01 PROCEDURE — 74011250637 HC RX REV CODE- 250/637: Performed by: SURGERY

## 2023-03-01 RX ORDER — POLYETHYLENE GLYCOL 3350 17 G/17G
17 POWDER, FOR SOLUTION ORAL DAILY
Qty: 15 PACKET | Refills: 0 | Status: SHIPPED | OUTPATIENT
Start: 2023-03-01 | End: 2023-03-16

## 2023-03-01 RX ORDER — IBUPROFEN 600 MG/1
600 TABLET ORAL
Qty: 30 TABLET | Refills: 0 | Status: SHIPPED | OUTPATIENT
Start: 2023-03-01

## 2023-03-01 RX ORDER — OXYCODONE HYDROCHLORIDE 5 MG/1
5 TABLET ORAL
Qty: 10 TABLET | Refills: 0 | Status: SHIPPED | OUTPATIENT
Start: 2023-03-01 | End: 2023-03-06

## 2023-03-01 RX ORDER — ACETAMINOPHEN 325 MG/1
650 TABLET ORAL EVERY 6 HOURS
Qty: 56 TABLET | Refills: 0 | Status: SHIPPED | OUTPATIENT
Start: 2023-03-01 | End: 2023-03-08

## 2023-03-01 RX ORDER — LACTULOSE 10 G/15ML
30 SOLUTION ORAL
Qty: 300 ML | Refills: 0 | Status: SHIPPED | OUTPATIENT
Start: 2023-03-01

## 2023-03-01 RX ORDER — AMOXICILLIN 250 MG
1 CAPSULE ORAL DAILY
Qty: 15 TABLET | Refills: 0 | Status: SHIPPED | OUTPATIENT
Start: 2023-03-01 | End: 2023-03-16

## 2023-03-01 RX ADMIN — ATORVASTATIN CALCIUM 10 MG: 10 TABLET, FILM COATED ORAL at 10:52

## 2023-03-01 RX ADMIN — DORZOLAMIDE HYDROCHLORIDE AND TIMOLOL MALEATE 1 DROP: 20; 5 SOLUTION/ DROPS OPHTHALMIC at 09:00

## 2023-03-01 RX ADMIN — DOCUSATE SODIUM - SENNOSIDES 1 TABLET: 50; 8.6 TABLET, FILM COATED ORAL at 10:52

## 2023-03-01 RX ADMIN — ACETAMINOPHEN 325MG 650 MG: 325 TABLET ORAL at 05:42

## 2023-03-01 RX ADMIN — KETOROLAC TROMETHAMINE 15 MG: 30 INJECTION, SOLUTION INTRAMUSCULAR; INTRAVENOUS at 02:06

## 2023-03-01 RX ADMIN — PAROXETINE HYDROCHLORIDE 20 MG: 20 TABLET, FILM COATED ORAL at 10:52

## 2023-03-01 RX ADMIN — LOSARTAN POTASSIUM 50 MG: 50 TABLET, FILM COATED ORAL at 10:52

## 2023-03-01 RX ADMIN — KETOROLAC TROMETHAMINE 15 MG: 30 INJECTION, SOLUTION INTRAMUSCULAR; INTRAVENOUS at 05:42

## 2023-03-01 NOTE — PROGRESS NOTES
DISCHARGE NOTE FROM [facility] NURSE    Patient determined to be stable for discharge by attending provider. I have reviewed the discharge instructions and follow-up appointments with the patient and spouse. They verbalized understanding and all questions were answered to their satisfaction. No complaints or further questions were expressed. Medications sent to pharmacy. Appropriate educational materials and medication side effect teaching were provided. PIV were removed prior to discharge. Patient did not discharge with any line, turk, or drain. All personal items collected during admission were returned to the patient prior to discharge. Post-op patient: Yes- Patient given post-op discharge kit and instructed on use.        Antonia Ann

## 2023-03-01 NOTE — PROGRESS NOTES
Progress Note    Patient: Patience Oneill MRN: [id number]  SSN: [id number]    YOB: 1940  Age: 80 y.o. Sex: male      Admit Date: 2023    2 Day Post-Op    Procedure:  Procedure(s):  ROBOTIC ASSISTED BILATERAL INGUINAL HERNIA REPAIR WITH MESH    Subjective:     Patient is much improved today and feels ready for discharge to home. Objective:     Visit Vitals  /86 (BP 1 Location: Left upper arm, BP Patient Position: Supine)   Pulse 63   Temp 98.1 °F (36.7 °C)   Resp 16   Ht 5' 10\" (1.778 m)   Wt 75.4 kg (166 lb 3.6 oz)   SpO2 96%   BMI 23.85 kg/m²       Temp (24hrs), Av.1 °F (36.7 °C), Min:98.1 °F (36.7 °C), Max:98.1 °F (36.7 °C)      Physical Exam:    Gen: NAD  Abd: incisions c/d/I, appropriately tender to palpation  B/l groins: no swelling    Data Review: images and reports reviewed    Lab Review: All lab results for the last 24 hours reviewed. Assessment:     Hospital Problems  Date Reviewed: 3/13/2017            Codes Class Noted POA    Inguinal hernia bilateral, non-recurrent ICD-10-CM: K40.20  ICD-9-CM: 550.92  2023 Unknown         Plan/Recommendations/Medical Decision Making:     Discharge to home.    Diet as tolerated    Lupe Cramer NP

## 2023-03-14 ENCOUNTER — OFFICE VISIT (OUTPATIENT)
Dept: SURGERY | Age: 83
End: 2023-03-14
Payer: MEDICARE

## 2023-03-14 VITALS
TEMPERATURE: 97.8 F | WEIGHT: 163 LBS | RESPIRATION RATE: 18 BRPM | HEART RATE: 72 BPM | SYSTOLIC BLOOD PRESSURE: 94 MMHG | HEIGHT: 70 IN | DIASTOLIC BLOOD PRESSURE: 57 MMHG | BODY MASS INDEX: 23.34 KG/M2 | OXYGEN SATURATION: 97 %

## 2023-03-14 DIAGNOSIS — K40.20 NON-RECURRENT BILATERAL INGUINAL HERNIA WITHOUT OBSTRUCTION OR GANGRENE: Primary | ICD-10-CM

## 2023-03-14 PROCEDURE — 99024 POSTOP FOLLOW-UP VISIT: CPT | Performed by: SURGERY

## 2023-03-14 NOTE — LETTER
3/14/2023    Patient: Beryle Sam   YOB: 1940   Date of Visit: 3/14/2023     Jay Edwards MD  [address]  [address] [address]  Via Fax: [phone]    Dear Jay Edwards MD,      Thank you for referring Mr. Сергей Green to [address] for evaluation. My notes for this consultation are attached. If you have questions, please do not hesitate to call me. I look forward to following your patient along with you.       Sincerely,    Corina Blancas MD

## 2023-03-14 NOTE — PROGRESS NOTES
Identified pt with two pt identifiers (name and ). Reviewed chart in preparation for visit and have obtained necessary documentation. Theresa Tello is a 80 y.o. male  Chief Complaint   Patient presents with    Surgical Follow-up     S/P 23 RIH repair w/mesh     Visit Vitals  BP (!) 94/57 (BP 1 Location: Right upper arm, BP Patient Position: Sitting, BP Cuff Size: Small adult)   Pulse 72   Temp 97.8 °F (36.6 °C) (Temporal)   Resp 18   Ht 5' 10\" (1.778 m)   Wt 73.9 kg (163 lb)   SpO2 97%   BMI 23.39 kg/m²       1. Have you been to the ER, urgent care clinic since your last visit? Hospitalized since your last visit? No    2. Have you seen or consulted any other health care providers outside of the [address] [place] [address] since your last visit? Include any pap smears or colon screening.  No

## 2023-03-14 NOTE — PROGRESS NOTES
Subjective:      Jahaira Matthews is a 80 y.o. male presents for postop care s/p robotic assisted bilateral inguinal hernia repair with mesh. Minimal pain in groin. No issues. Objective:     Visit Vitals  BP (!) 94/57 (BP 1 Location: Right upper arm, BP Patient Position: Sitting, BP Cuff Size: Small adult)   Pulse 72   Temp 97.8 °F (36.6 °C) (Temporal)   Resp 18   Ht 5' 10\" (1.778 m)   Wt 73.9 kg (163 lb)   SpO2 97%   BMI 23.39 kg/m²       General:  alert, cooperative, no distress, appears stated age   Abdomen: soft, bowel sounds active, non-tender   Incision:   healing well, no drainage, no erythema, no hernia, no seroma, no swelling, no dehiscence, incision well approximated     Assessment:     Doing well postoperatively. Plan:     1. Continue any current medications. 2. Wound care discussed. 3. No heavy lifting for 6 weeks  4. Follow up prn.

## 2023-04-22 DIAGNOSIS — M54.2 NECK PAIN: Primary | ICD-10-CM

## (undated) DEVICE — ADHESIVE SKIN CLSR 0.7ML TOP DERMBND ADV

## (undated) DEVICE — SOLUTION IRRIG 1000ML STRL H2O USP PLAS POUR BTL

## (undated) DEVICE — STERILE POLYISOPRENE POWDER-FREE SURGICAL GLOVES: Brand: PROTEXIS

## (undated) DEVICE — Device

## (undated) DEVICE — SUTURE VCRL SZ 3-0 L27IN ABSRB UD L26MM SH 1/2 CIR J416H

## (undated) DEVICE — TRAY CATH 16F DRN BG LTX -- CONVERT TO ITEM 363158

## (undated) DEVICE — 3M™ IOBAN™ 2 ANTIMICROBIAL INCISE DRAPE 6650EZ: Brand: IOBAN™ 2

## (undated) DEVICE — TOWEL OR BL STR 4/PK -- MEDICHOICE - MEDLINE

## (undated) DEVICE — SUTURE VCRL SZ 1 L36IN ABSRB VLT L36MM CT-1 1/2 CIR J347H

## (undated) DEVICE — GOWN,SIRUS,NONRNF,SETINSLV,XL,20/CS: Brand: MEDLINE

## (undated) DEVICE — AMD ANTIMICROBIAL DRAIN SPONGES, 6 PLY, 0.2% POLYHEXAMETHYLENE BIGUANIDE HCI (PHMB): Brand: EXCILON

## (undated) DEVICE — COVER MPLR TIP CRV SCIS ACC DA VINCI

## (undated) DEVICE — SUT STRATA PDS+ 15CM SZ 3-0 SH -- STRATAFIX

## (undated) DEVICE — 3M™ COBAN™ NL STERILE NON-LATEX SELF-ADHERENT WRAP, 2086S, 6 IN X 5 YD (15 CM X 4,5 M), 12 ROLLS/CASE: Brand: 3M™ COBAN™

## (undated) DEVICE — GOWN,SIRUS,FABRNF,XL,20/CS: Brand: MEDLINE

## (undated) DEVICE — CUSTOM CAST PD STR

## (undated) DEVICE — SUTURE STRATAFIX SPRL MCRYL + SZ 2-0 L18IN ABSRB UD CT-1 SXMP1B413

## (undated) DEVICE — DRAPE ARM BX/20 -- DA VINCI XI

## (undated) DEVICE — SUTURE VCRL SZ 2-0 L36IN ABSRB VLT L36MM CT-1 1/2 CIR J345H

## (undated) DEVICE — GLOVE SURG SZ 65 L12IN FNGR THK79MIL GRN LTX FREE

## (undated) DEVICE — NEEDLE HYPO 22GA L1.5IN BLK POLYPR HUB S STL REG BVL STR

## (undated) DEVICE — REM POLYHESIVE ADULT PATIENT RETURN ELECTRODE: Brand: VALLEYLAB

## (undated) DEVICE — BLADE ASSEMB CLP HAIR FINE --

## (undated) DEVICE — DEVON™ KNEE AND BODY STRAP 60" X 3" (1.5 M X 7.6 CM): Brand: DEVON

## (undated) DEVICE — STRYKER PERFORMANCE SERIES SAGITTAL BLADE: Brand: STRYKER PERFORMANCE SERIES

## (undated) DEVICE — HYPODERMIC SAFETY NEEDLE: Brand: MAGELLAN

## (undated) DEVICE — 3000CC GUARDIAN II: Brand: GUARDIAN

## (undated) DEVICE — GLOVE SURG SZ 65 THK91MIL LTX FREE SYN POLYISOPRENE

## (undated) DEVICE — SOLUTION IV 1000ML 0.9% SOD CHL

## (undated) DEVICE — DRAPE,REIN 53X77,STERILE: Brand: MEDLINE

## (undated) DEVICE — PAD PT POS 36 IN SURGYPAD DISP

## (undated) DEVICE — MIXER BNE CEM VAC BRKOFF NOZ PRISM II

## (undated) DEVICE — DRAPE COLUMN 14X6.5X5 IN -- DA VINCI XI - SNGL USE

## (undated) DEVICE — SOLUTION IRRIG 3000ML 0.9% SOD CHL FLX CONT 0797208] ICU MEDICAL INC]

## (undated) DEVICE — GOWN,SIRUS,NONRNF,SETINSLV,2XL,18/CS: Brand: MEDLINE

## (undated) DEVICE — KENDALL SCD EXPRESS SLEEVES, KNEE LENGTH, MEDIUM: Brand: KENDALL SCD

## (undated) DEVICE — STERILE POLYISOPRENE POWDER-FREE SURGICAL GLOVES WITH EMOLLIENT COATING: Brand: PROTEXIS

## (undated) DEVICE — TROCAR LAP L100MM DIA5MM BLDELSS W/ STBL SL ENDOPATH XCEL

## (undated) DEVICE — SUTURE MCRYL SZ 4-0 L27IN ABSRB UD L19MM PS-2 1/2 CIR PRIM Y426H

## (undated) DEVICE — COVER,MAYO STAND,STERILE: Brand: MEDLINE

## (undated) DEVICE — MEDI-VAC SUCTION HIGH CAPACITY: Brand: CARDINAL HEALTH

## (undated) DEVICE — SUT VCRL 0 27IN UR6 VIO --

## (undated) DEVICE — GENERAL LAPAROSCOPY-MRMC: Brand: MEDLINE INDUSTRIES, INC.

## (undated) DEVICE — SOLUTION IRRIG 1000ML H2O STRL BLT

## (undated) DEVICE — GLOVE SURG SZ 65 CRM LTX FREE POLYISOPRENE POLYMER BEAD ANTI

## (undated) DEVICE — HOOK LOCK LATEX FREE ELASTIC BANDAGE D/L 6INX10YD

## (undated) DEVICE — T4 HOOD

## (undated) DEVICE — KIT SUTURING DEVICE M-CLOSE

## (undated) DEVICE — HANDLE LT SNAP ON ULT DURABLE LENS FOR TRUMPF ALC DISPOSABLE

## (undated) DEVICE — VISUALIZATION SYSTEM: Brand: CLEARIFY

## (undated) DEVICE — SUTURE STRATAFIX SYMMETRIC SZ 1 L18IN ABSRB VLT CT1 L36CM SXPP1A404

## (undated) DEVICE — NEEDLE HYPO 18GA L1.5IN PNK S STL HUB POLYPR SHLD REG BVL

## (undated) DEVICE — OBTRTR BLDELSS OPT 8MM DISP -- DA VINICI XI - SNGL USE

## (undated) DEVICE — SEAL UNIV 5-8MM DISP BX/10 -- DA VINCI XI - SNGL USE

## (undated) DEVICE — SLIM BODY SKIN STAPLER: Brand: APPOSE ULC

## (undated) DEVICE — TROCAR: Brand: KII® OPTICAL ACCESS SYSTEM

## (undated) DEVICE — 3M™ IOBAN™ 2 ANTIMICROBIAL INCISE DRAPE 6651EZ: Brand: IOBAN™ 2

## (undated) DEVICE — SYR LR LCK 1ML GRAD NSAF 30ML --

## (undated) DEVICE — DRAIN KT WND 10FR RND 400ML --

## (undated) DEVICE — KIT INFECTION CTRL ST FRAN --

## (undated) DEVICE — PLUS HANDPIECE WITH SUCTION TUBING AND TRAUMA TIP WITH SMALL SOFT CONE: Brand: SURGILAV

## (undated) DEVICE — (D)PREP SKN CHLRAPRP APPL 26ML -- CONVERT TO ITEM 371833